# Patient Record
Sex: FEMALE | Race: WHITE | URBAN - METROPOLITAN AREA
[De-identification: names, ages, dates, MRNs, and addresses within clinical notes are randomized per-mention and may not be internally consistent; named-entity substitution may affect disease eponyms.]

---

## 2017-07-10 ENCOUNTER — INPATIENT (INPATIENT)
Facility: HOSPITAL | Age: 32
LOS: 3 days | Discharge: HOME | DRG: 378 | End: 2017-07-14
Attending: HOSPITALIST | Admitting: HOSPITALIST
Payer: COMMERCIAL

## 2017-07-10 VITALS
TEMPERATURE: 98 F | DIASTOLIC BLOOD PRESSURE: 78 MMHG | HEIGHT: 59 IN | OXYGEN SATURATION: 98 % | SYSTOLIC BLOOD PRESSURE: 122 MMHG | RESPIRATION RATE: 18 BRPM | WEIGHT: 130.07 LBS | HEART RATE: 90 BPM

## 2017-07-10 DIAGNOSIS — K52.9 NONINFECTIVE GASTROENTERITIS AND COLITIS, UNSPECIFIED: ICD-10-CM

## 2017-07-10 DIAGNOSIS — K51.00 ULCERATIVE (CHRONIC) PANCOLITIS WITHOUT COMPLICATIONS: ICD-10-CM

## 2017-07-10 DIAGNOSIS — Z98.89 OTHER SPECIFIED POSTPROCEDURAL STATES: Chronic | ICD-10-CM

## 2017-07-10 DIAGNOSIS — K92.0 HEMATEMESIS: ICD-10-CM

## 2017-07-10 LAB
ALBUMIN SERPL ELPH-MCNC: 4.4 G/DL — SIGNIFICANT CHANGE UP (ref 3.3–5.2)
ALP SERPL-CCNC: 76 U/L — SIGNIFICANT CHANGE UP (ref 40–120)
ALT FLD-CCNC: 12 U/L — SIGNIFICANT CHANGE UP
ANION GAP SERPL CALC-SCNC: 15 MMOL/L — SIGNIFICANT CHANGE UP (ref 5–17)
APPEARANCE UR: CLEAR — SIGNIFICANT CHANGE UP
APTT BLD: 34.1 SEC — SIGNIFICANT CHANGE UP (ref 27.5–37.4)
AST SERPL-CCNC: 21 U/L — SIGNIFICANT CHANGE UP
BASOPHILS # BLD AUTO: 0 K/UL — SIGNIFICANT CHANGE UP (ref 0–0.2)
BILIRUB SERPL-MCNC: 0.3 MG/DL — LOW (ref 0.4–2)
BILIRUB UR-MCNC: NEGATIVE — SIGNIFICANT CHANGE UP
BLD GP AB SCN SERPL QL: SIGNIFICANT CHANGE UP
BUN SERPL-MCNC: 19 MG/DL — SIGNIFICANT CHANGE UP (ref 8–20)
CALCIUM SERPL-MCNC: 9.4 MG/DL — SIGNIFICANT CHANGE UP (ref 8.6–10.2)
CHLORIDE SERPL-SCNC: 100 MMOL/L — SIGNIFICANT CHANGE UP (ref 98–107)
CO2 SERPL-SCNC: 24 MMOL/L — SIGNIFICANT CHANGE UP (ref 22–29)
COLOR SPEC: YELLOW — SIGNIFICANT CHANGE UP
CREAT SERPL-MCNC: 1.37 MG/DL — HIGH (ref 0.5–1.3)
DIFF PNL FLD: NEGATIVE — SIGNIFICANT CHANGE UP
EOSINOPHIL # BLD AUTO: 0.2 K/UL — SIGNIFICANT CHANGE UP (ref 0–0.5)
EOSINOPHIL NFR BLD AUTO: 2 % — SIGNIFICANT CHANGE UP (ref 0–5)
EPI CELLS # UR: SIGNIFICANT CHANGE UP
GLUCOSE SERPL-MCNC: 104 MG/DL — SIGNIFICANT CHANGE UP (ref 70–115)
GLUCOSE UR QL: NEGATIVE MG/DL — SIGNIFICANT CHANGE UP
HCT VFR BLD CALC: 40.7 % — SIGNIFICANT CHANGE UP (ref 37–47)
HGB BLD-MCNC: 13.8 G/DL — SIGNIFICANT CHANGE UP (ref 12–16)
INR BLD: 1.04 RATIO — SIGNIFICANT CHANGE UP (ref 0.88–1.16)
KETONES UR-MCNC: NEGATIVE — SIGNIFICANT CHANGE UP
LACTATE BLDV-MCNC: 0.7 MMOL/L — SIGNIFICANT CHANGE UP (ref 0.5–2)
LEUKOCYTE ESTERASE UR-ACNC: NEGATIVE — SIGNIFICANT CHANGE UP
LIDOCAIN IGE QN: 24 U/L — SIGNIFICANT CHANGE UP (ref 22–51)
LYMPHOCYTES # BLD AUTO: 12 % — LOW (ref 20–55)
LYMPHOCYTES # BLD AUTO: 2 K/UL — SIGNIFICANT CHANGE UP (ref 1–4.8)
MCHC RBC-ENTMCNC: 33.7 PG — HIGH (ref 27–31)
MCHC RBC-ENTMCNC: 33.9 G/DL — SIGNIFICANT CHANGE UP (ref 32–36)
MCV RBC AUTO: 99.3 FL — HIGH (ref 81–99)
MONOCYTES # BLD AUTO: 1.5 K/UL — HIGH (ref 0–0.8)
MONOCYTES NFR BLD AUTO: 9 % — SIGNIFICANT CHANGE UP (ref 3–10)
NEUTROPHILS # BLD AUTO: 12.8 K/UL — HIGH (ref 1.8–8)
NEUTROPHILS NFR BLD AUTO: 75 % — HIGH (ref 37–73)
NITRITE UR-MCNC: NEGATIVE — SIGNIFICANT CHANGE UP
PH UR: 7 — SIGNIFICANT CHANGE UP (ref 5–8)
PLAT MORPH BLD: NORMAL — SIGNIFICANT CHANGE UP
PLATELET # BLD AUTO: 341 K/UL — SIGNIFICANT CHANGE UP (ref 150–400)
POTASSIUM SERPL-MCNC: 4.2 MMOL/L — SIGNIFICANT CHANGE UP (ref 3.5–5.3)
POTASSIUM SERPL-SCNC: 4.2 MMOL/L — SIGNIFICANT CHANGE UP (ref 3.5–5.3)
PROT SERPL-MCNC: 7.5 G/DL — SIGNIFICANT CHANGE UP (ref 6.6–8.7)
PROT UR-MCNC: 30 MG/DL
PROTHROM AB SERPL-ACNC: 11.5 SEC — SIGNIFICANT CHANGE UP (ref 9.8–12.7)
RBC # BLD: 4.1 M/UL — LOW (ref 4.4–5.2)
RBC # FLD: 13.6 % — SIGNIFICANT CHANGE UP (ref 11–15.6)
RBC BLD AUTO: NORMAL — SIGNIFICANT CHANGE UP
SODIUM SERPL-SCNC: 139 MMOL/L — SIGNIFICANT CHANGE UP (ref 135–145)
SP GR SPEC: 1 — LOW (ref 1.01–1.02)
TYPE + AB SCN PNL BLD: SIGNIFICANT CHANGE UP
UROBILINOGEN FLD QL: NEGATIVE MG/DL — SIGNIFICANT CHANGE UP
VARIANT LYMPHS # BLD: 2 % — SIGNIFICANT CHANGE UP (ref 0–6)
WBC # BLD: 16.6 K/UL — HIGH (ref 4.8–10.8)
WBC # FLD AUTO: 16.6 K/UL — HIGH (ref 4.8–10.8)
WBC UR QL: SIGNIFICANT CHANGE UP

## 2017-07-10 PROCEDURE — 71020: CPT | Mod: 26

## 2017-07-10 PROCEDURE — 74177 CT ABD & PELVIS W/CONTRAST: CPT | Mod: 26

## 2017-07-10 PROCEDURE — 99253 IP/OBS CNSLTJ NEW/EST LOW 45: CPT

## 2017-07-10 PROCEDURE — 99285 EMERGENCY DEPT VISIT HI MDM: CPT | Mod: 25

## 2017-07-10 PROCEDURE — 76705 ECHO EXAM OF ABDOMEN: CPT | Mod: 26

## 2017-07-10 PROCEDURE — 99223 1ST HOSP IP/OBS HIGH 75: CPT

## 2017-07-10 RX ORDER — ONDANSETRON 8 MG/1
4 TABLET, FILM COATED ORAL EVERY 8 HOURS
Qty: 0 | Refills: 0 | Status: DISCONTINUED | OUTPATIENT
Start: 2017-07-10 | End: 2017-07-10

## 2017-07-10 RX ORDER — ONDANSETRON 8 MG/1
4 TABLET, FILM COATED ORAL ONCE
Qty: 0 | Refills: 0 | Status: COMPLETED | OUTPATIENT
Start: 2017-07-10 | End: 2017-07-10

## 2017-07-10 RX ORDER — SODIUM CHLORIDE 9 MG/ML
1000 INJECTION INTRAMUSCULAR; INTRAVENOUS; SUBCUTANEOUS ONCE
Qty: 0 | Refills: 0 | Status: COMPLETED | OUTPATIENT
Start: 2017-07-10 | End: 2017-07-10

## 2017-07-10 RX ORDER — ONDANSETRON 8 MG/1
4 TABLET, FILM COATED ORAL EVERY 6 HOURS
Qty: 0 | Refills: 0 | Status: DISCONTINUED | OUTPATIENT
Start: 2017-07-10 | End: 2017-07-13

## 2017-07-10 RX ORDER — FAMOTIDINE 10 MG/ML
20 INJECTION INTRAVENOUS ONCE
Qty: 0 | Refills: 0 | Status: COMPLETED | OUTPATIENT
Start: 2017-07-10 | End: 2017-07-10

## 2017-07-10 RX ORDER — CIPROFLOXACIN LACTATE 400MG/40ML
400 VIAL (ML) INTRAVENOUS ONCE
Qty: 0 | Refills: 0 | Status: COMPLETED | OUTPATIENT
Start: 2017-07-10 | End: 2017-07-10

## 2017-07-10 RX ORDER — CIPROFLOXACIN LACTATE 400MG/40ML
400 VIAL (ML) INTRAVENOUS EVERY 12 HOURS
Qty: 0 | Refills: 0 | Status: DISCONTINUED | OUTPATIENT
Start: 2017-07-10 | End: 2017-07-12

## 2017-07-10 RX ORDER — SODIUM CHLORIDE 9 MG/ML
3 INJECTION INTRAMUSCULAR; INTRAVENOUS; SUBCUTANEOUS ONCE
Qty: 0 | Refills: 0 | Status: COMPLETED | OUTPATIENT
Start: 2017-07-10 | End: 2017-07-10

## 2017-07-10 RX ORDER — PANTOPRAZOLE SODIUM 20 MG/1
40 TABLET, DELAYED RELEASE ORAL ONCE
Qty: 0 | Refills: 0 | Status: COMPLETED | OUTPATIENT
Start: 2017-07-10 | End: 2017-07-10

## 2017-07-10 RX ORDER — SODIUM CHLORIDE 9 MG/ML
1000 INJECTION INTRAMUSCULAR; INTRAVENOUS; SUBCUTANEOUS
Qty: 0 | Refills: 0 | Status: DISCONTINUED | OUTPATIENT
Start: 2017-07-10 | End: 2017-07-11

## 2017-07-10 RX ORDER — METRONIDAZOLE 500 MG
500 TABLET ORAL EVERY 8 HOURS
Qty: 0 | Refills: 0 | Status: DISCONTINUED | OUTPATIENT
Start: 2017-07-10 | End: 2017-07-12

## 2017-07-10 RX ORDER — SUCRALFATE 1 G
1 TABLET ORAL ONCE
Qty: 0 | Refills: 0 | Status: COMPLETED | OUTPATIENT
Start: 2017-07-10 | End: 2017-07-10

## 2017-07-10 RX ORDER — PANTOPRAZOLE SODIUM 20 MG/1
8 TABLET, DELAYED RELEASE ORAL
Qty: 80 | Refills: 0 | Status: DISCONTINUED | OUTPATIENT
Start: 2017-07-10 | End: 2017-07-11

## 2017-07-10 RX ORDER — METRONIDAZOLE 500 MG
500 TABLET ORAL ONCE
Qty: 0 | Refills: 0 | Status: COMPLETED | OUTPATIENT
Start: 2017-07-10 | End: 2017-07-10

## 2017-07-10 RX ORDER — IPRATROPIUM/ALBUTEROL SULFATE 18-103MCG
3 AEROSOL WITH ADAPTER (GRAM) INHALATION EVERY 6 HOURS
Qty: 0 | Refills: 0 | Status: DISCONTINUED | OUTPATIENT
Start: 2017-07-10 | End: 2017-07-14

## 2017-07-10 RX ORDER — MORPHINE SULFATE 50 MG/1
4 CAPSULE, EXTENDED RELEASE ORAL ONCE
Qty: 0 | Refills: 0 | Status: DISCONTINUED | OUTPATIENT
Start: 2017-07-10 | End: 2017-07-10

## 2017-07-10 RX ADMIN — Medication 200 MILLIGRAM(S): at 15:05

## 2017-07-10 RX ADMIN — SODIUM CHLORIDE 1000 MILLILITER(S): 9 INJECTION INTRAMUSCULAR; INTRAVENOUS; SUBCUTANEOUS at 21:33

## 2017-07-10 RX ADMIN — SODIUM CHLORIDE 3 MILLILITER(S): 9 INJECTION INTRAMUSCULAR; INTRAVENOUS; SUBCUTANEOUS at 15:57

## 2017-07-10 RX ADMIN — PANTOPRAZOLE SODIUM 40 MILLIGRAM(S): 20 TABLET, DELAYED RELEASE ORAL at 08:03

## 2017-07-10 RX ADMIN — ONDANSETRON 4 MILLIGRAM(S): 8 TABLET, FILM COATED ORAL at 08:06

## 2017-07-10 RX ADMIN — ONDANSETRON 4 MILLIGRAM(S): 8 TABLET, FILM COATED ORAL at 21:35

## 2017-07-10 RX ADMIN — ONDANSETRON 4 MILLIGRAM(S): 8 TABLET, FILM COATED ORAL at 23:03

## 2017-07-10 RX ADMIN — Medication 1 GRAM(S): at 08:07

## 2017-07-10 RX ADMIN — SODIUM CHLORIDE 1000 MILLILITER(S): 9 INJECTION INTRAMUSCULAR; INTRAVENOUS; SUBCUTANEOUS at 08:03

## 2017-07-10 RX ADMIN — Medication 100 MILLIGRAM(S): at 17:14

## 2017-07-10 RX ADMIN — Medication 100 MILLIGRAM(S): at 23:03

## 2017-07-10 RX ADMIN — PANTOPRAZOLE SODIUM 10 MG/HR: 20 TABLET, DELAYED RELEASE ORAL at 18:31

## 2017-07-10 RX ADMIN — FAMOTIDINE 20 MILLIGRAM(S): 10 INJECTION INTRAVENOUS at 08:03

## 2017-07-10 RX ADMIN — MORPHINE SULFATE 4 MILLIGRAM(S): 50 CAPSULE, EXTENDED RELEASE ORAL at 15:05

## 2017-07-10 RX ADMIN — ONDANSETRON 4 MILLIGRAM(S): 8 TABLET, FILM COATED ORAL at 15:05

## 2017-07-10 NOTE — H&P ADULT - ASSESSMENT
33 y/o female with PMHx of Asthma, ruptured ovarian cyst s/p ex laparotomy, colitis in 2016, chronic diarrhea for 2-3 years (never being worked up), presented with abdomen pain, nausea, bloody vomiting, chronic diarrhea. Exam unremarkable, labs significant for leukocytosis, ROSALINDA and CT abdomen shows colitis:    Colitis:  - Keep NPO, IVFs  - Start Cipro and flagyl.  - Pt had colitis one year ago and was seen by GI, followed up outpatient with Dr Atkins and colonoscopy was recommended given chronic diarrhea but has been done.    GI bleed likely PUD:  - Chronic smoker with heartburns, nausea, bloody vomiting this morning, patient hemodynamically stable for now.   -Keep NPO, IVFs, IVPB PPI, Zofran prn for nausea/vomtiting.  - Keep on monitored bed, monitor CBC.   - GI consulted, awaiting evaluation. If any active bleeding while inpatient please upgrade to step-down and call on-call GI stat.    H/o Asthma:  - Stable, not on any medication at home. Prn Duonebs.    DVT ppx:  - SCD, no anticoagulation given bloody vomiting. 33 y/o female with PMHx of Asthma, ruptured ovarian cyst s/p ex laparotomy, colitis in 2016, chronic diarrhea for 2-3 years (never being worked up), presented with abdomen pain, nausea, bloody vomiting, chronic diarrhea. Exam unremarkable, labs significant for leukocytosis, ROSALINDA and CT abdomen shows colitis:    Colitis:  - Keep NPO, IVFs  - Start Cipro and flagyl.  - Pt had colitis one year ago and was seen by GI, followed up outpatient with Dr Atkins and colonoscopy was recommended given chronic diarrhea but has been done.    GI bleed likely PUD:  - Chronic smoker with heartburns, nausea, bloody vomiting this morning, patient hemodynamically stable for now.   -Keep NPO, IVFs, IVPB PPI, Zofran prn for nausea/vomtiting.  - Keep on monitored bed, monitor CBC.   - GI consulted, awaiting evaluation. If any active bleeding while inpatient please upgrade to step-down and call on-call GI stat.    ROSALINDA in setting of dehydration/vomiting:  - Start on IVfs  cc/hour and monitor BMP.    H/o Asthma:  - Stable, not on any medication at home. Prn Duonebs.    DVT ppx:  - SCD, no anticoagulation given bloody vomiting. 31 y/o female with PMHx of Asthma, ruptured ovarian cyst s/p ex laparotomy, colitis in 2016, chronic diarrhea for 2-3 years (never being worked up), heartburns lately, presented to ED with abdomen pain for 2 weeks associated with nausea, hematemesis X 4 episodes, chronic diarrhea. Exam unremarkable, labs significant for leukocytosis, ROSALINDA and CT abdomen shows colitis.    Colitis:  - Start IVFs, send stool studies.  - S/p Cipro and flagyl dose in ED, will c/w same antibiotics for now to cover infectious etiology.   - Pt had colitis one year ago and was seen by GI, followed up outpatient with Dr Atkins and colonoscopy was recommended given chronic diarrhea but has been done.  - GI consulted today.    Hematemesis/GI bleed likely PUD:  - Chronic smoker with heartburns, nausea, bloody vomiting this morning, patient hemodynamically stable for now.   - Keep NPO, IVFs, IVPB PPI, Zofran prn for nausea/vomiting.  - Keep on monitored bed, monitor CBC.   - GI consulted, awaiting evaluation. If any active bleeding while inpatient please upgrade to step-down and call on-call GI stat.    ROSALINDA in setting of dehydration/vomiting:  - Start on IVFs  cc/hour and monitor BMP.    H/o Asthma:  - Stable, not on any medication at home. Prn Duonebs.    DVT ppx:  - SCD, no anticoagulation given bloody vomiting.

## 2017-07-10 NOTE — ED ADULT NURSE NOTE - ED STAT RN HANDOFF DETAILS
chart reviewed for stat orders report given to hr rn, pt a/ox3 kendal,ruddyn good resp even and un;lbored iv patent

## 2017-07-10 NOTE — H&P ADULT - HISTORY OF PRESENT ILLNESS
Pt is 33 y/o female with PMHx of Asthma, ruptured ovarian cyst s/p ex laparotomy, colitis in 2016, chronic diarrhea for 2-3 years (never being worked up), presented to ER with active complaints of abdomen pain for 2 weeks. Patient stated pain started as heartburns 2 weeks, intermittent, gradually progressing and now having diffuse abdomen pain, describes as gas like feeling, no relieving or aggravating factors, associated with nausea and vomiting X 4 episode earlier this morning. Pt further stated it was blood vomiting with bright red blood, last episode was earlier this morning before coming to ER. She also reported chronic diarrhea 4-5 episode per day for last 2-3 years, no reported blood in stool.   Off note, patient had last admission at [facility] 06/16 with colitis and ROSALINDA. At that time she was evaluated by GI and afterward seen by Dr Atkins and colonoscopy was recommended but has not been done yet. Pt is 31 y/o female with PMHx of Asthma, ruptured ovarian cyst s/p ex laparotomy, colitis in 2016, chronic diarrhea for 2-3 years (never being worked up), heartburns lately, presented to ER with active complaints of abdomen pain for 2 weeks. Patient stated pain started as heartburns 2 weeks, intermittent, gradually progressing and now having diffuse abdomen pain, describes as gas like feeling, no relieving or aggravating factors, tried pepcid/tumps at home without significant improvement, associated with reduced appetite, nausea and bloody vomiting X 4 episode earlier this morning. Pt further stated it was bloody vomiting with bright red blood, last episode was earlier this morning before coming to ER. She further stated having heartburns lately after having [employer] coffee. She also reported chronic diarrhea 4-5 episode per day for last 2-3 years, no reported blood in stool. She has not been worked up for chronic diarrhea.   Off note, patient had last admission at [facility] 06/16 with colitis and ROSALINDA. At that time she was evaluated by GI and afterward seen by Dr Atkins as an outpatient and colonoscopy was recommended but has not been done yet.

## 2017-07-10 NOTE — H&P ADULT - NSHPSOCIALHISTORY_GEN_ALL_CORE
Active smoker, denied use of illicit drugs, non-alcoholic. Pt is single, does not have kids, work as psychiatric attendant, active smoker 1 PPD for 10 years, denied use of illicit drugs, non-alcoholic.

## 2017-07-10 NOTE — H&P ADULT - NSHPPHYSICALEXAM_GEN_ALL_CORE
PHYSICAL EXAM:    Vital Signs Last 24 Hrs  T(C): 36.8 (10 Jul 2017 07:30), Max: 36.8 (10 Jul 2017 07:30)  T(F): 98.2 (10 Jul 2017 07:30), Max: 98.2 (10 Jul 2017 07:30)  HR: 90 (10 Jul 2017 07:30) (90 - 90)  BP: 122/78 (10 Jul 2017 07:30) (122/78 - 122/78)  BP(mean): --  RR: 18 (10 Jul 2017 07:30) (18 - 18)  SpO2: 98% (10 Jul 2017 07:30) (98% - 98%)    GENERAL: Young female lying comfortably on chair, NAD  HEENT: PERRL, +EOMI  NECK: soft, Supple, No JVD,   CHEST/LUNG: Clear to auscultate bilaterally; No wheezing  HEART: S1S2+, Regular rate and rhythm; No murmurs.  ABDOMEN: Soft, Nontender, Nondistended; Bowel sounds present  EXTREMITIES:  2+ Peripheral Pulses, No edema  SKIN: No rashes or lesions  NEURO: AAOX3, no focal deficits, no motor r sensory loss  PSYCH: normal mood PHYSICAL EXAM:    Vital Signs Last 24 Hrs  T(C): 36.8 (10 Jul 2017 07:30), Max: 36.8 (10 Jul 2017 07:30)  T(F): 98.2 (10 Jul 2017 07:30), Max: 98.2 (10 Jul 2017 07:30)  HR: 90 (10 Jul 2017 07:30) (90 - 90)  BP: 122/78 (10 Jul 2017 07:30) (122/78 - 122/78)  BP(mean): --  RR: 18 (10 Jul 2017 07:30) (18 - 18)  SpO2: 98% (10 Jul 2017 07:30) (98% - 98%)    GENERAL: Young female lying comfortably on chair, NAD  HEENT: PERRL, +EOMI  NECK: soft, Supple, No JVD,   CHEST/LUNG: Clear to auscultate bilaterally; No wheezing  HEART: S1S2+, Regular rate and rhythm; No murmurs, no tachycardia.  ABDOMEN: Soft, Nontender, Nondistended; Bowel sounds present  EXTREMITIES:  2+ Peripheral Pulses, No edema  SKIN: No rashes or lesions  NEURO: AAOX3, no focal deficits, no motor r sensory loss  PSYCH: normal mood PHYSICAL EXAM:    Vital Signs Last 24 Hrs  T(C): 36.8 (10 Jul 2017 07:30), Max: 36.8 (10 Jul 2017 07:30)  T(F): 98.2 (10 Jul 2017 07:30), Max: 98.2 (10 Jul 2017 07:30)  HR: 90 (10 Jul 2017 07:30) (90 - 90)  BP: 122/78 (10 Jul 2017 07:30) (122/78 - 122/78)  BP(mean): --  RR: 18 (10 Jul 2017 07:30) (18 - 18)  SpO2: 98% (10 Jul 2017 07:30) (98% - 98%)    GENERAL: Young female lying comfortably on chair, NAD  HEENT: PERRL, +EOMI  NECK: soft, Supple, No JVD,   CHEST/LUNG: Clear to auscultate bilaterally; No wheezing  HEART: S1S2+, Regular rate and rhythm; No murmurs, no tachycardia.  ABDOMEN: Soft, Nontender, Nondistended; Bowel sounds present.  EXTREMITIES:  2+ Peripheral Pulses, No edema  SKIN: No rashes or lesions  NEURO: AAOX3, no focal deficits, no motor r sensory loss  PSYCH: normal mood

## 2017-07-10 NOTE — ED STATDOCS - OBJECTIVE STATEMENT
31 y/o F pt with PMHx of colitis presents to ED c/o epigastric abdominal pain and heartburn x2 weeks. Pt reports her pain now radiates into her back and she had 4 episodes of vomiting; the last 3 "was all blood". She describes the color as bright red. Pt rates the severity of her pain as 7/10. She does not currently take any medications. She saw a GI doctor (Dr. Chapman) a few months ago and is supposed to f/u for an endoscopy. She also experiences decreased PO intake due to heartburn. Pt admits to current every day tobacco usage. As per mother, she was hospitalized for 4 days last year for colitis and acute renal failure. She has self medicated with Tums and Pepcid. Pt denies fever, chills, CP, SOB, diarrhea, melena, dysuria, hematuria, headache, numbness, and tingling. No further complaints at this time. Allergy to penicillin. 31 y/o F pt with PMHx of colitis presents to ED c/o epigastric abdominal pain and heartburn x2 weeks. Pt reports her pain now radiates into her back and she had 4 episodes of vomiting this morning; the last 3 "was all blood". She describes the color as bright red (pt provided picture which showed some pink streaks in vomitus). Pt rates the severity of her pain as 7/10. She does not currently take any medications. She saw a GI doctor (Dr. Chapman) a few months ago and is supposed to f/u for a colonoscopy. She also experiences decreased PO intake due to heartburn but says she is able to eat and drink. Pt admits to current every day tobacco usage. As per mother, she was hospitalized for 4 days last year for colitis and acute renal failure. She has self medicated with Tums and Pepcid. Pt denies fever, chills, CP, SOB, diarrhea, melena, dysuria, hematuria, headache, numbness, and tingling. No further complaints at this time. Allergy to penicillin.

## 2017-07-10 NOTE — H&P ADULT - NSHPREVIEWOFSYSTEMS_GEN_ALL_CORE
REVIEW OF SYSTEMS  General:	+ve some fatigue/weakness, reduced appetite, nausea and vomiting.  Ophthalmologic: No eye pain, itching or blurry vision.  Respiratory and Thorax: No chest pain, SOB, cough, sputum.  Cardiovascular: No chest pain, palpitation, SOB.	  Gastrointestinal: Positive for diarrhea, abdomen pain, nausea, vomiting.  Genitourinary: Negative  Musculoskeletal: Negative	  Neurological: Intact, no headache, dizziness or focal signs.	  Psychiatric: Normal Mood.	  Endocrine: Negative General: +ve some fatigue/weakness, reduced appetite, nausea and vomiting.  Ophthalmologic: No eye pain, itching or blurry vision.  Respiratory and Thorax: No chest pain, SOB, cough, sputum.  Cardiovascular: No chest pain, palpitation, SOB.	  Gastrointestinal: Positive for diarrhea, abdomen pain, nausea, vomiting.  Genitourinary: Negative  Musculoskeletal: Negative	  Neurological: Intact, no headache, dizziness or focal signs.	  Psychiatric: Normal Mood.	  Endocrine: Negative

## 2017-07-10 NOTE — H&P ADULT - NSHPLABSRESULTS_GEN_ALL_CORE
LABS:                        13.8   16.6  )-----------( 341      ( 10 Jul 2017 08:11 )             40.7     07-10    139  |  100  |  19.0  ----------------------------<  104  4.2   |  24.0  |  1.37<H>    Ca    9.4      10 Jul 2017 08:11    TPro  7.5  /  Alb  4.4  /  TBili  0.3<L>  /  DBili  x   /  AST  21  /  ALT  12  /  AlkPhos  76  07-10    PT/INR - ( 10 Jul 2017 08:11 )   PT: 11.5 sec;   INR: 1.04 ratio         PTT - ( 10 Jul 2017 08:11 )  PTT:34.1 sec  RECENT CULTURES:      LIVER FUNCTIONS - ( 10 Jul 2017 08:11 )  Alb: 4.4 g/dL / Pro: 7.5 g/dL / ALK PHOS: 76 U/L / ALT: 12 U/L / AST: 21 U/L / GGT: x           Urinalysis Basic - ( 10 Jul 2017 09:48 )    Color: Yellow / Appearance: Clear / S.005 / pH: x  Gluc: x / Ketone: Negative  / Bili: Negative / Urobili: Negative mg/dL   Blood: x / Protein: 30 mg/dL / Nitrite: Negative   Leuk Esterase: Negative / RBC: x / WBC 0-2   Sq Epi: x / Non Sq Epi: x / Bacteria: x

## 2017-07-10 NOTE — ED STATDOCS - MEDICAL DECISION MAKING DETAILS
CXR, US, medication for dyspepsia, and reevaluate. Consider admission pending results. CXR, US, medication for dyspepsia, and reevaluate. possible ulcer vs. soto lange. Consider admission pending results/symptoms.

## 2017-07-10 NOTE — H&P ADULT - FAMILY HISTORY
<<-----Click on this checkbox to enter Family History Family history of colitis     Sibling  Still living? Yes, Estimated age: Age Unknown  Family history of irritable bowel syndrome, Age at diagnosis: Age Unknown

## 2017-07-10 NOTE — ED STATDOCS - GASTROINTESTINAL, MLM
RLQ tenderness and mild epigastric tenderness. No abdominal distension. Pt showed picture of vomit, which had pinkish tint. mild RLQ tenderness and mild epigastric tenderness. No abdominal distension. Pt showed picture of vomit, which had pinkish tint.

## 2017-07-10 NOTE — H&P ADULT - PMH
Acute renal failure  Active  Asthma  Stable, not in flare  Colitis    Herpes    Ovarian cyst  Stable. Acute renal failure    Asthma    Colitis    Herpes    Ovarian cyst

## 2017-07-10 NOTE — ED ADULT TRIAGE NOTE - CHIEF COMPLAINT QUOTE
"For the past two weeks I have been having upper abdominal pain, I woke up this morning and threw up blood." Pt denies fever but states has chills. Pt states she has colitis so she normally has diarrhea.

## 2017-07-10 NOTE — ED STATDOCS - PROGRESS NOTE DETAILS
NP NOTE: Pt seen by intake physician and HPI/ROS/PE/MDM reviewed. Pt seen and evaluated. Discussed plan and any resulted studies at this time. Will continue to monitor and re-evaluate.  Re-Evaluation: labs noted, US noted, pt now resting comfortably, previously tender RLQ, WBC = 16, CT r/o appy vs recurrent colitis, will continue to monitor and re-eval discussed results with patient, agrees with admission. pain and nausea persist, will add morphine, zofran, as well as cultures and lactate, cipro and flagyl.

## 2017-07-10 NOTE — H&P ADULT - NSHPOUTPATIENTPROVIDERS_GEN_ALL_CORE
Unknown. Dr Estes from [facility]. Dr Estes from [facility].  Phone:[phone].

## 2017-07-10 NOTE — CONSULT NOTE ADULT - SUBJECTIVE AND OBJECTIVE BOX
Patient is a 32y old  Female who presents with a chief complaint of     HPI:  Pt is 31 y/o female with PMHx of Asthma, ruptured ovarian cyst s/p ex laparotomy. Patient  states for the last one week, she has been getting frequent heartburn which began after drinking an expreso coffee. . She is getting epigastric cramping  pain  lasting 2 hours.( severe at times worse with eating. Decreased appetite. Today had 4 episodes of hematemesis. She had a photo- blood tinged emesis. Tried pepcid and Tums with no relief.  She has been having 4-5 loose, non blood stools  for 2 days. No fevers. Also with back pain.  No sick contacts, no recent antibiotics. No NSAIDS. IN ER pt noted to have wbc 16, CT showed colitis in ascending, transverse adn descending ( mild ) , worst in the sigmoid.      Pt was admitted in 2016 with a left sided colitis. Saw Dr Good a few months ago in follow from 2016 hospitalization with plan for colonoscopy. Pt states she gets intermittent diarrhea a few times a week on a chronic basis.          REVIEW OF SYSTEMS:  Constitutional: No fever, weight loss or fatigue  ENMT:  No difficulty hearing, tinnitus, vertigo; No sinus or throat pain  Respiratory: No cough, wheezing, chills or hemoptysis  Cardiovascular: No chest pain, palpitations, dizziness or leg swelling  Gastrointestinal: + epigastric pain, diarrhea  Musculoskeletal: No joint pain or swelling; No muscle, back or extremity pain    PAST MEDICAL & SURGICAL HISTORY:  Acute renal failure  Colitis  Herpes  Asthma  Ovarian cyst  H/O exploratory laparotomy: s/p ovarian cyst rupture affecting artery      FAMILY HISTORY:  Family history of colitis  Family history of irritable bowel syndrome (Sibling)      SOCIAL HISTORY:  Smoking Status: [ ] Current, [ ] Former, [ ] Never  Pack Years:  [  ] EtOH- no  [  ] IVDA- no    MEDICATIONS:  MEDICATIONS  (STANDING):  metroNIDAZOLE  IVPB 500 milliGRAM(s) IV Intermittent once  sodium chloride 0.9% Bolus 1000 milliLiter(s) IV Bolus once  sodium chloride 0.9% Bolus 1000 milliLiter(s) IV Bolus once  sodium chloride 0.9%. 1000 milliLiter(s) (125 mL/Hr) IV Continuous <Continuous>  ciprofloxacin   IVPB 400 milliGRAM(s) IV Intermittent every 12 hours  metroNIDAZOLE  IVPB 500 milliGRAM(s) IV Intermittent every 8 hours  pantoprazole Infusion 8 mG/Hr (10 mL/Hr) IV Continuous <Continuous>    MEDICATIONS  (PRN):  ondansetron Injectable 4 milliGRAM(s) IV Push every 8 hours PRN Nausea and/or Vomiting      Allergies    penicillin (Unknown)    Intolerances        Vital Signs Last 24 Hrs  T(C): 36.6 (10 Jul 2017 15:23), Max: 36.8 (10 Jul 2017 07:30)  T(F): 97.9 (10 Jul 2017 15:23), Max: 98.2 (10 Jul 2017 07:30)  HR: 82 (10 Jul 2017 15:23) (82 - 90)  BP: 102/65 (10 Jul 2017 15:23) (102/65 - 122/78)  BP(mean): --  RR: 18 (10 Jul 2017 15:23) (18 - 18)  SpO2: 100% (10 Jul 2017 15:23) (98% - 100%)        PHYSICAL EXAM:    General: Well developed; well nourished; in no acute distress  HEENT: MMM, conjunctiva and sclera clear  H- RRR  L- CTA  Gastrointestinal: Soft, non-tender non-distended; Normal bowel sounds; No rebound or guarding  Extremities: Normal range of motion, No clubbing, cyanosis or edema  Neurological: Alert and oriented x3  Skin: Warm and dry. No obvious rash  rectal - empty rectal vault. No blood, no melena      LABS:                        13.8   16.6  )-----------( 341      ( 10 Jul 2017 08:11 )             40.7     10 Jul 2017 08:11    139    |  100    |  19.0   ----------------------------<  104    4.2     |  24.0   |  1.37     Ca    9.4        10 Jul 2017 08:11    TPro  7.5    /  Alb  4.4    /  TBili  0.3    /  DBili  x      /  AST  21     /  ALT  12     /  AlkPhos  76     / Amylase x      /Lipase 24     10 Jul 2017 08:11              RADIOLOGY & ADDITIONAL STUDIES: < from: CT Abdomen and Pelvis w/ Oral Cont and w/ IV Cont (07.10.17 @ 12:09) >  IMPRESSION:        Pancolitis, most severe involving the sigmoid colon.

## 2017-07-10 NOTE — CONSULT NOTE ADULT - PROBLEM SELECTOR RECOMMENDATION 9
Maybe infectious as she has diarrhea and nausea and vomiting.    Hematemesis. Maybe esophagitis from GERD. H+H stable  IV fluid ( creatinine is 1.37), zofran, protonix clear liquids  EGD if H+H drops significantly  Stool studies to r/o infectious colitis

## 2017-07-11 LAB
ANION GAP SERPL CALC-SCNC: 16 MMOL/L — SIGNIFICANT CHANGE UP (ref 5–17)
BUN SERPL-MCNC: 13 MG/DL — SIGNIFICANT CHANGE UP (ref 8–20)
C DIFF BY PCR RESULT: SIGNIFICANT CHANGE UP
C DIFF TOX GENS STL QL NAA+PROBE: SIGNIFICANT CHANGE UP
CALCIUM SERPL-MCNC: 8.5 MG/DL — LOW (ref 8.6–10.2)
CHLORIDE SERPL-SCNC: 106 MMOL/L — SIGNIFICANT CHANGE UP (ref 98–107)
CO2 SERPL-SCNC: 21 MMOL/L — LOW (ref 22–29)
CREAT SERPL-MCNC: 1.94 MG/DL — HIGH (ref 0.5–1.3)
GLUCOSE SERPL-MCNC: 89 MG/DL — SIGNIFICANT CHANGE UP (ref 70–115)
HCT VFR BLD CALC: 37 % — SIGNIFICANT CHANGE UP (ref 37–47)
HGB BLD-MCNC: 12.4 G/DL — SIGNIFICANT CHANGE UP (ref 12–16)
MCHC RBC-ENTMCNC: 33.5 G/DL — SIGNIFICANT CHANGE UP (ref 32–36)
MCHC RBC-ENTMCNC: 33.5 PG — HIGH (ref 27–31)
MCV RBC AUTO: 100 FL — HIGH (ref 81–99)
PLATELET # BLD AUTO: 297 K/UL — SIGNIFICANT CHANGE UP (ref 150–400)
POTASSIUM SERPL-MCNC: 3.8 MMOL/L — SIGNIFICANT CHANGE UP (ref 3.5–5.3)
POTASSIUM SERPL-SCNC: 3.8 MMOL/L — SIGNIFICANT CHANGE UP (ref 3.5–5.3)
RBC # BLD: 3.7 M/UL — LOW (ref 4.4–5.2)
RBC # FLD: 13.9 % — SIGNIFICANT CHANGE UP (ref 11–15.6)
SODIUM SERPL-SCNC: 143 MMOL/L — SIGNIFICANT CHANGE UP (ref 135–145)
WBC # BLD: 12.8 K/UL — HIGH (ref 4.8–10.8)
WBC # FLD AUTO: 12.8 K/UL — HIGH (ref 4.8–10.8)

## 2017-07-11 PROCEDURE — 99233 SBSQ HOSP IP/OBS HIGH 50: CPT

## 2017-07-11 RX ORDER — PANTOPRAZOLE SODIUM 20 MG/1
40 TABLET, DELAYED RELEASE ORAL
Qty: 0 | Refills: 0 | Status: DISCONTINUED | OUTPATIENT
Start: 2017-07-11 | End: 2017-07-12

## 2017-07-11 RX ORDER — SODIUM CHLORIDE 9 MG/ML
1000 INJECTION INTRAMUSCULAR; INTRAVENOUS; SUBCUTANEOUS
Qty: 0 | Refills: 0 | Status: DISCONTINUED | OUTPATIENT
Start: 2017-07-11 | End: 2017-07-13

## 2017-07-11 RX ORDER — SODIUM CHLORIDE 9 MG/ML
500 INJECTION INTRAMUSCULAR; INTRAVENOUS; SUBCUTANEOUS ONCE
Qty: 0 | Refills: 0 | Status: DISCONTINUED | OUTPATIENT
Start: 2017-07-11 | End: 2017-07-14

## 2017-07-11 RX ADMIN — ONDANSETRON 4 MILLIGRAM(S): 8 TABLET, FILM COATED ORAL at 17:50

## 2017-07-11 RX ADMIN — Medication 100 MILLIGRAM(S): at 05:31

## 2017-07-11 RX ADMIN — ONDANSETRON 4 MILLIGRAM(S): 8 TABLET, FILM COATED ORAL at 11:29

## 2017-07-11 RX ADMIN — Medication 200 MILLIGRAM(S): at 11:29

## 2017-07-11 RX ADMIN — Medication 200 MILLIGRAM(S): at 02:32

## 2017-07-11 RX ADMIN — Medication 100 MILLIGRAM(S): at 17:49

## 2017-07-11 RX ADMIN — PANTOPRAZOLE SODIUM 10 MG/HR: 20 TABLET, DELAYED RELEASE ORAL at 07:58

## 2017-07-11 RX ADMIN — ONDANSETRON 4 MILLIGRAM(S): 8 TABLET, FILM COATED ORAL at 05:31

## 2017-07-11 RX ADMIN — SODIUM CHLORIDE 125 MILLILITER(S): 9 INJECTION INTRAMUSCULAR; INTRAVENOUS; SUBCUTANEOUS at 08:38

## 2017-07-11 RX ADMIN — PANTOPRAZOLE SODIUM 10 MG/HR: 20 TABLET, DELAYED RELEASE ORAL at 05:30

## 2017-07-11 NOTE — PROGRESS NOTE ADULT - ASSESSMENT
33 y/o female with PMHx of Asthma, ruptured ovarian cyst s/p ex laparotomy, colitis in 2016, chronic diarrhea for 2-3 years (never being worked up), heartburns lately, presented to ED with abdomen pain for 2 weeks associated with nausea, hematemesis X 4 episodes, chronic diarrhea. Exam unremarkable, labs significant for leukocytosis, ROSALINDA and CT abdomen shows colitis. Pt admitted with impression of hematemesis likely from PUD and colitis. She was kept NPO, started on IVFs, Ciprofloxacin and flagyl. GI was consulted and recommendation appreciated. Pt symptoms improved.    Colitis:  - C/w IVFs, Cipro and flagyl.   -F/u stool studies.   - Pt had colitis one year ago and was seen by GI, followed up outpatient with Dr Atkins and colonoscopy was recommended given chronic diarrhea but has been done.   -GI consult appreciated.    Hematemesis/GI bleed likely PUD:  - Chronic smoker with heartburns, nausea, hematemesis, hemodynamically stable for now. Slight drop in all cell line on CBC likely due to hydration.   - Pt ok with liquid diet, advance today,  IVFs, IVPB PPI, Zofran prn for nausea/vomiting.  - If continues to improve, may be discharge tomorrow.  - GI consult appreciated.    ROSALINDA in setting of dehydration/vomiting:  -Worsening renal function, Cr 1.37--->1.9  - Stat  bolus and then increase IVFs to 150 cc/hour.    H/o Asthma:  - Stable, not on any medication at home. Prn Duoneb.    DVT ppx:  - SCD, no anticoagulation given bloody vomiting.

## 2017-07-11 NOTE — PROGRESS NOTE ADULT - SUBJECTIVE AND OBJECTIVE BOX
PAMELA ROBLERO Female 32y [id number]    Pt seen and examined at bedside, no over night event reported by night staff. Pt reports doing well, no more nausea/vomiting reported but does reports diarrhea which she attributes at baseline. She was able to tolerate liquid diet this morning.            Physical Exam:    Vital Signs Last 24 Hrs  T(C): 36.6 (2017 11:37), Max: 36.7 (10 Jul 2017 15:53)  T(F): 97.8 (2017 11:37), Max: 98.1 (10 Jul 2017 15:53)  HR: 62 (2017 11:37) (57 - 82)  BP: 89/60 (2017 11:37) (89/60 - 116/70)  BP(mean): --  RR: 18 (2017 11:37) (18 - 18)  SpO2: 98% (2017 11:37) (96% - 100%)    GENERAL: Young female lying comfortably on chair, NAD  HEENT: PERRL, +EOMI  NECK: soft, Supple, No JVD,   CHEST/LUNG: Clear to auscultate bilaterally; No wheezing  HEART: S1S2+, Regular rate and rhythm; No murmurs, no tachycardia.  ABDOMEN: Soft, Nontender, Nondistended; Bowel sounds present.  EXTREMITIES:  2+ Peripheral Pulses, No edema  SKIN: No rashes or lesions  NEURO: AAOX3, no focal deficits, no motor r sensory loss  PSYCH: normal mood      MEDICATIONS  (STANDING):  ciprofloxacin   IVPB 400 milliGRAM(s) IV Intermittent every 12 hours  metroNIDAZOLE  IVPB 500 milliGRAM(s) IV Intermittent every 8 hours  pantoprazole Infusion 8 mG/Hr (10 mL/Hr) IV Continuous <Continuous>  ondansetron Injectable 4 milliGRAM(s) IV Push every 6 hours  sodium chloride 0.9% Bolus 500 milliLiter(s) IV Bolus once  sodium chloride 0.9%. 1000 milliLiter(s) (150 mL/Hr) IV Continuous <Continuous>    MEDICATIONS  (PRN):  ALBUTerol/ipratropium for Nebulization 3 milliLiter(s) Nebulizer every 6 hours PRN Shortness of Breath        Labs:  LABS:                        12.4   12.8  )-----------( 297      ( 2017 09:09 )             37.0     07-11    143  |  106  |  13.0  ----------------------------<  89  3.8   |  21.0<L>  |  1.94<H>    Ca    8.5<L>      2017 09:09    TPro  7.5  /  Alb  4.4  /  TBili  0.3<L>  /  DBili  x   /  AST  21  /  ALT  12  /  AlkPhos  76  07-10    PT/INR - ( 10 Jul 2017 08:11 )   PT: 11.5 sec;   INR: 1.04 ratio         PTT - ( 10 Jul 2017 08:11 )  PTT:34.1 sec  RECENT CULTURES:      LIVER FUNCTIONS - ( 10 Jul 2017 08:11 )  Alb: 4.4 g/dL / Pro: 7.5 g/dL / ALK PHOS: 76 U/L / ALT: 12 U/L / AST: 21 U/L / GGT: x           Urinalysis Basic - ( 10 Jul 2017 09:48 )    Color: Yellow / Appearance: Clear / S.005 / pH: x  Gluc: x / Ketone: Negative  / Bili: Negative / Urobili: Negative mg/dL   Blood: x / Protein: 30 mg/dL / Nitrite: Negative   Leuk Esterase: Negative / RBC: x / WBC 0-2   Sq Epi: x / Non Sq Epi: x / Bacteria: x

## 2017-07-12 DIAGNOSIS — R19.7 DIARRHEA, UNSPECIFIED: ICD-10-CM

## 2017-07-12 DIAGNOSIS — R12 HEARTBURN: ICD-10-CM

## 2017-07-12 LAB
ANION GAP SERPL CALC-SCNC: 14 MMOL/L — SIGNIFICANT CHANGE UP (ref 5–17)
BUN SERPL-MCNC: 8 MG/DL — SIGNIFICANT CHANGE UP (ref 8–20)
CALCIUM SERPL-MCNC: 8.7 MG/DL — SIGNIFICANT CHANGE UP (ref 8.6–10.2)
CHLORIDE SERPL-SCNC: 106 MMOL/L — SIGNIFICANT CHANGE UP (ref 98–107)
CO2 SERPL-SCNC: 22 MMOL/L — SIGNIFICANT CHANGE UP (ref 22–29)
CREAT SERPL-MCNC: 1.31 MG/DL — HIGH (ref 0.5–1.3)
CULTURE RESULTS: SIGNIFICANT CHANGE UP
GLUCOSE SERPL-MCNC: 83 MG/DL — SIGNIFICANT CHANGE UP (ref 70–115)
MAGNESIUM SERPL-MCNC: 1.9 MG/DL — SIGNIFICANT CHANGE UP (ref 1.8–2.6)
POTASSIUM SERPL-MCNC: 3.4 MMOL/L — LOW (ref 3.5–5.3)
POTASSIUM SERPL-SCNC: 3.4 MMOL/L — LOW (ref 3.5–5.3)
SODIUM SERPL-SCNC: 142 MMOL/L — SIGNIFICANT CHANGE UP (ref 135–145)
SPECIMEN SOURCE: SIGNIFICANT CHANGE UP
T3 SERPL-MCNC: 85 NG/DL — SIGNIFICANT CHANGE UP (ref 80–200)
T4 AB SER-ACNC: 5.5 UG/DL — SIGNIFICANT CHANGE UP (ref 4.5–12)
TSH SERPL-MCNC: 2.45 UIU/ML — SIGNIFICANT CHANGE UP (ref 0.27–4.2)

## 2017-07-12 PROCEDURE — 74220 X-RAY XM ESOPHAGUS 1CNTRST: CPT | Mod: 26

## 2017-07-12 PROCEDURE — 99232 SBSQ HOSP IP/OBS MODERATE 35: CPT

## 2017-07-12 PROCEDURE — 99233 SBSQ HOSP IP/OBS HIGH 50: CPT

## 2017-07-12 RX ORDER — MORPHINE SULFATE 50 MG/1
4 CAPSULE, EXTENDED RELEASE ORAL EVERY 4 HOURS
Qty: 0 | Refills: 0 | Status: DISCONTINUED | OUTPATIENT
Start: 2017-07-12 | End: 2017-07-13

## 2017-07-12 RX ORDER — POTASSIUM CHLORIDE 20 MEQ
40 PACKET (EA) ORAL ONCE
Qty: 0 | Refills: 0 | Status: COMPLETED | OUTPATIENT
Start: 2017-07-12 | End: 2017-07-12

## 2017-07-12 RX ORDER — PANTOPRAZOLE SODIUM 20 MG/1
40 TABLET, DELAYED RELEASE ORAL
Qty: 0 | Refills: 0 | Status: DISCONTINUED | OUTPATIENT
Start: 2017-07-12 | End: 2017-07-14

## 2017-07-12 RX ADMIN — PANTOPRAZOLE SODIUM 40 MILLIGRAM(S): 20 TABLET, DELAYED RELEASE ORAL at 08:24

## 2017-07-12 RX ADMIN — Medication 100 MILLIGRAM(S): at 05:43

## 2017-07-12 RX ADMIN — ONDANSETRON 4 MILLIGRAM(S): 8 TABLET, FILM COATED ORAL at 23:00

## 2017-07-12 RX ADMIN — SODIUM CHLORIDE 150 MILLILITER(S): 9 INJECTION INTRAMUSCULAR; INTRAVENOUS; SUBCUTANEOUS at 05:43

## 2017-07-12 RX ADMIN — PANTOPRAZOLE SODIUM 40 MILLIGRAM(S): 20 TABLET, DELAYED RELEASE ORAL at 16:22

## 2017-07-12 RX ADMIN — Medication 40 MILLIEQUIVALENT(S): at 10:22

## 2017-07-12 RX ADMIN — ONDANSETRON 4 MILLIGRAM(S): 8 TABLET, FILM COATED ORAL at 05:43

## 2017-07-12 RX ADMIN — SODIUM CHLORIDE 150 MILLILITER(S): 9 INJECTION INTRAMUSCULAR; INTRAVENOUS; SUBCUTANEOUS at 23:00

## 2017-07-12 RX ADMIN — SODIUM CHLORIDE 150 MILLILITER(S): 9 INJECTION INTRAMUSCULAR; INTRAVENOUS; SUBCUTANEOUS at 16:18

## 2017-07-12 RX ADMIN — ONDANSETRON 4 MILLIGRAM(S): 8 TABLET, FILM COATED ORAL at 17:24

## 2017-07-12 RX ADMIN — Medication 100 MILLIGRAM(S): at 01:02

## 2017-07-12 RX ADMIN — ONDANSETRON 4 MILLIGRAM(S): 8 TABLET, FILM COATED ORAL at 01:02

## 2017-07-12 RX ADMIN — ONDANSETRON 4 MILLIGRAM(S): 8 TABLET, FILM COATED ORAL at 11:39

## 2017-07-12 RX ADMIN — Medication 3 MILLILITER(S): at 08:26

## 2017-07-12 NOTE — PROGRESS NOTE ADULT - ASSESSMENT
31 y/o female with PMHx of Asthma, ruptured ovarian cyst s/p ex laparotomy, colitis in 2016, chronic diarrhea for 2-3 years (never being worked up), heartburns lately, presented to ED with abdomen pain for 2 weeks associated with nausea, hematemesis X 4 episodes, chronic diarrhea. Exam unremarkable, labs significant for leukocytosis, ROSALINDA and CT abdomen shows colitis on admission. Pt admitted with impression of hematemesis likely from PUD and colitis. She was initially kept NPO, started on IVFs, Ciprofloxacin and flagyl. GI was consulted and recommendation appreciated. Pt symptoms improved, diet resumed and well tolerated. Pt developed reaction (hives) with ciprofloxacin which was held and flagyl also discontinued per GI less likely infectious etiology given negative C diff and pending stool O&P/Culture.  Esophagram normal.      Colitis:  - Improving, nausea/vomiting resolved. Per patient diarrhea chronic and at baseline. Antibiotics discontinued per GI, less likely infectious. C/w IVFs   -GI consult appreciated, awaiting stool studies result, if negative will need colonoscopy inpatient vs outpatient TBD with GI.    Hematemesis: Resolved  - Chronic smoker with heartburns, nausea, hematemesis, hemodynamically stable for now. No more bleeding episode.   - C/w IVFs, PPI, Zofran prn. Tolerating diet.  - GI consult appreciated.    ROSALINDA in setting of dehydration/vomiting:  - Improving, Cr 1.9-----> 1.3  - C/w IVFs.    H/o Asthma:  - Stable, not on any medication at home. Prn Duoneb.    DVT ppx:  - SCD, no anticoagulation given bloody vomiting.

## 2017-07-12 NOTE — PROGRESS NOTE ADULT - SUBJECTIVE AND OBJECTIVE BOX
Pt seen and examined f/y of abdominal pain, nausea, vomiting, heartburn and diarrhea  This morning she denies any heartburn, nausea or vomiting but still with diarrhea and abdominal cramping. Also c/o sensation of food sitting in subzyphoid area but no actual dysphagia. She says she feels the food in her upper chest and subxyphoid are while eating. Stool for c diff neg. C&S pending as is O&P.    REVIEW OF SYSTEMS:    CONSTITUTIONAL: No fever, weight loss, or fatigue  EYES: No eye pain, visual disturbances, or discharge  ENMT:  No difficulty hearing, tinnitus, vertigo; No sinus or throat pain  RESPIRATORY: No cough, wheezing, chills or hemoptysis; No shortness of breath  CARDIOVASCULAR: No chest pain, palpitations, dizziness, or leg swelling  GASTROINTESTINAL: as above    MEDICATIONS:  MEDICATIONS  (STANDING):  metroNIDAZOLE  IVPB 500 milliGRAM(s) IV Intermittent every 8 hours  ondansetron Injectable 4 milliGRAM(s) IV Push every 6 hours  sodium chloride 0.9% Bolus 500 milliLiter(s) IV Bolus once  sodium chloride 0.9%. 1000 milliLiter(s) (150 mL/Hr) IV Continuous <Continuous>  potassium chloride    Tablet ER 40 milliEquivalent(s) Oral once  pantoprazole    Tablet 40 milliGRAM(s) Oral two times a day before meals    MEDICATIONS  (PRN):  ALBUTerol/ipratropium for Nebulization 3 milliLiter(s) Nebulizer every 6 hours PRN Shortness of Breath  morphine  - Injectable 4 milliGRAM(s) IV Push every 4 hours PRN Moderate Pain (4 - 6)      Allergies    penicillin (Unknown)    Intolerances        Vital Signs Last 24 Hrs  T(C): 36.9 (2017 08:16), Max: 36.9 (2017 18:30)  T(F): 98.5 (2017 08:16), Max: 98.5 (2017 18:30)  HR: 48 (2017 08:16) (48 - 68)  BP: 101/62 (2017 08:16) (89/60 - 145/80)  BP(mean): --  RR: 16 (2017 08:16) (16 - 18)  SpO2: 97% (2017 08:16) (97% - 99%)     @ 07: @ 07:00  --------------------------------------------------------  IN: 900 mL / OUT: 0 mL / NET: 900 mL        PHYSICAL EXAM:    General: Well developed; well nourished; in no acute distress  HEENT: MMM, conjunctiva and sclera clear  Gastrointestinal:Abdomen: Soft non-tender non-distended; Normal bowel sounds; No hepatosplenomegaly  Extremities: no cyanosis, clubbing or edema.  Skin: Warm and dry. No obvious rash    LABS:      CBC Full  -  ( 2017 09:09 )  WBC Count : 12.8 K/uL  Hemoglobin : 12.4 g/dL  Hematocrit : 37.0 %  Platelet Count - Automated : 297 K/uL  Mean Cell Volume : 100.0 fl  Mean Cell Hemoglobin : 33.5 pg  Mean Cell Hemoglobin Concentration : 33.5 g/dL  Auto Neutrophil # : x  Auto Lymphocyte # : x  Auto Monocyte # : x  Auto Eosinophil # : x  Auto Basophil # : x  Auto Neutrophil % : x  Auto Lymphocyte % : x  Auto Monocyte % : x  Auto Eosinophil % : x  Auto Basophil % : x        142  |  106  |  8.0  ----------------------------<  83  3.4<L>   |  22.0  |  1.31<H>    Ca    8.7      2017 05:51  Mg     1.9     -12            Urinalysis Basic - ( 10 Jul 2017 09:48 )    Color: Yellow / Appearance: Clear / S.005 / pH: x  Gluc: x / Ketone: Negative  / Bili: Negative / Urobili: Negative mg/dL   Blood: x / Protein: 30 mg/dL / Nitrite: Negative   Leuk Esterase: Negative / RBC: x / WBC 0-2   Sq Epi: x / Non Sq Epi: x / Bacteria: x

## 2017-07-12 NOTE — PROGRESS NOTE ADULT - SUBJECTIVE AND OBJECTIVE BOX
PAMELA ROBLERO Female 32y [id number]    Pt seen and examined at bedside, no over night event reported by night staff. Pt reports doing well, reports nausea/vomiting has been resolved but diarrhea persist which is at baseline as per patient.    CONSTITUTIONAL: No fever, chills, fatigue.  RESPIRATORY: No cough, SOB, phlegm  CARDIOVASCULAR: No chest pain, palpitations  GASTROINTESTINAL: +ve diarrhea, no abdominal pain, No nausea or vomiting.  NEUROLOGICAL: No headaches,  loss of strength.  MISCELLANEOUS: No joint swelling or pain.        Physical Exam:    Vital Signs Last 24 Hrs  T(C): 36.6 (12 Jul 2017 11:40), Max: 36.9 (12 Jul 2017 08:16)  T(F): 97.8 (12 Jul 2017 11:40), Max: 98.5 (12 Jul 2017 08:16)  HR: 50 (12 Jul 2017 11:40) (48 - 68)  BP: 98/63 (12 Jul 2017 11:40) (98/63 - 145/80)  BP(mean): --  RR: 16 (12 Jul 2017 11:40) (16 - 18)  SpO2: 99% (12 Jul 2017 11:40) (97% - 99%)    GENERAL: Young female lying comfortably on chair, NAD  HEENT: PERRL, +EOMI  NECK: soft, Supple, No JVD,   CHEST/LUNG: Clear to auscultate bilaterally; No wheezing  HEART: S1S2+, Regular rate and rhythm; No murmurs, no tachycardia.  ABDOMEN: Soft, Nontender, Nondistended; Bowel sounds present.  EXTREMITIES:  2+ Peripheral Pulses, No edema  SKIN: No rashes or lesions  NEURO: AAOX3, no focal deficits, no motor r sensory loss  PSYCH: normal mood          MEDICATIONS  (STANDING):  ondansetron Injectable 4 milliGRAM(s) IV Push every 6 hours  sodium chloride 0.9% Bolus 500 milliLiter(s) IV Bolus once  sodium chloride 0.9%. 1000 milliLiter(s) (150 mL/Hr) IV Continuous <Continuous>  pantoprazole    Tablet 40 milliGRAM(s) Oral two times a day before meals    MEDICATIONS  (PRN):  ALBUTerol/ipratropium for Nebulization 3 milliLiter(s) Nebulizer every 6 hours PRN Shortness of Breath  morphine  - Injectable 4 milliGRAM(s) IV Push every 4 hours PRN Moderate Pain (4 - 6)        Labs:  LABS:                        12.4   12.8  )-----------( 297      ( 11 Jul 2017 09:09 )             37.0     07-12    142  |  106  |  8.0  ----------------------------<  83  3.4<L>   |  22.0  |  1.31<H>    Ca    8.7      12 Jul 2017 05:51  Mg     1.9     07-12        RECENT CULTURES:

## 2017-07-12 NOTE — PROGRESS NOTE ADULT - PROBLEM SELECTOR PLAN 2
Significance unclear and appear to have resolved although now she has some unusual complaints of ? dysphagia but atypical and doubt food actually lodging in esophagus. Will obtaine esophagram and increase pantoprazole to BID

## 2017-07-13 DIAGNOSIS — K21.9 GASTRO-ESOPHAGEAL REFLUX DISEASE WITHOUT ESOPHAGITIS: ICD-10-CM

## 2017-07-13 LAB
ANION GAP SERPL CALC-SCNC: 14 MMOL/L — SIGNIFICANT CHANGE UP (ref 5–17)
BUN SERPL-MCNC: 5 MG/DL — LOW (ref 8–20)
CALCIUM SERPL-MCNC: 8.2 MG/DL — LOW (ref 8.6–10.2)
CHLORIDE SERPL-SCNC: 104 MMOL/L — SIGNIFICANT CHANGE UP (ref 98–107)
CO2 SERPL-SCNC: 20 MMOL/L — LOW (ref 22–29)
CREAT SERPL-MCNC: 0.86 MG/DL — SIGNIFICANT CHANGE UP (ref 0.5–1.3)
CULTURE RESULTS: SIGNIFICANT CHANGE UP
GLUCOSE SERPL-MCNC: 97 MG/DL — SIGNIFICANT CHANGE UP (ref 70–115)
HCT VFR BLD CALC: 34.1 % — LOW (ref 37–47)
HGB BLD-MCNC: 11.3 G/DL — LOW (ref 12–16)
INR BLD: 1.27 RATIO — HIGH (ref 0.88–1.16)
MCHC RBC-ENTMCNC: 33.1 G/DL — SIGNIFICANT CHANGE UP (ref 32–36)
MCHC RBC-ENTMCNC: 33.2 PG — HIGH (ref 27–31)
MCV RBC AUTO: 100.3 FL — HIGH (ref 81–99)
PLATELET # BLD AUTO: 244 K/UL — SIGNIFICANT CHANGE UP (ref 150–400)
POTASSIUM SERPL-MCNC: 3.4 MMOL/L — LOW (ref 3.5–5.3)
POTASSIUM SERPL-SCNC: 3.4 MMOL/L — LOW (ref 3.5–5.3)
PROTHROM AB SERPL-ACNC: 14 SEC — HIGH (ref 9.8–12.7)
RBC # BLD: 3.4 M/UL — LOW (ref 4.4–5.2)
RBC # FLD: 13 % — SIGNIFICANT CHANGE UP (ref 11–15.6)
SODIUM SERPL-SCNC: 138 MMOL/L — SIGNIFICANT CHANGE UP (ref 135–145)
SPECIMEN SOURCE: SIGNIFICANT CHANGE UP
WBC # BLD: 9.4 K/UL — SIGNIFICANT CHANGE UP (ref 4.8–10.8)
WBC # FLD AUTO: 9.4 K/UL — SIGNIFICANT CHANGE UP (ref 4.8–10.8)

## 2017-07-13 PROCEDURE — 99232 SBSQ HOSP IP/OBS MODERATE 35: CPT

## 2017-07-13 RX ORDER — ONDANSETRON 8 MG/1
4 TABLET, FILM COATED ORAL ONCE
Qty: 0 | Refills: 0 | Status: DISCONTINUED | OUTPATIENT
Start: 2017-07-13 | End: 2017-07-14

## 2017-07-13 RX ORDER — SODIUM CHLORIDE 9 MG/ML
1000 INJECTION INTRAMUSCULAR; INTRAVENOUS; SUBCUTANEOUS
Qty: 0 | Refills: 0 | Status: DISCONTINUED | OUTPATIENT
Start: 2017-07-13 | End: 2017-07-14

## 2017-07-13 RX ORDER — MORPHINE SULFATE 50 MG/1
2 CAPSULE, EXTENDED RELEASE ORAL EVERY 6 HOURS
Qty: 0 | Refills: 0 | Status: DISCONTINUED | OUTPATIENT
Start: 2017-07-13 | End: 2017-07-14

## 2017-07-13 RX ORDER — POTASSIUM CHLORIDE 20 MEQ
40 PACKET (EA) ORAL ONCE
Qty: 0 | Refills: 0 | Status: COMPLETED | OUTPATIENT
Start: 2017-07-13 | End: 2017-07-13

## 2017-07-13 RX ADMIN — PANTOPRAZOLE SODIUM 40 MILLIGRAM(S): 20 TABLET, DELAYED RELEASE ORAL at 17:14

## 2017-07-13 RX ADMIN — Medication 40 MILLIEQUIVALENT(S): at 12:19

## 2017-07-13 RX ADMIN — SODIUM CHLORIDE 150 MILLILITER(S): 9 INJECTION INTRAMUSCULAR; INTRAVENOUS; SUBCUTANEOUS at 06:16

## 2017-07-13 RX ADMIN — ONDANSETRON 4 MILLIGRAM(S): 8 TABLET, FILM COATED ORAL at 06:15

## 2017-07-13 RX ADMIN — SODIUM CHLORIDE 75 MILLILITER(S): 9 INJECTION INTRAMUSCULAR; INTRAVENOUS; SUBCUTANEOUS at 12:19

## 2017-07-13 RX ADMIN — PANTOPRAZOLE SODIUM 40 MILLIGRAM(S): 20 TABLET, DELAYED RELEASE ORAL at 06:15

## 2017-07-13 NOTE — PROGRESS NOTE ADULT - SUBJECTIVE AND OBJECTIVE BOX
PAMELA ROBLERO Female 32y [id number]    Chief complaints: Diarrhea    Pt seen and examined at bedside, no over night event reported by night staff. Pt continue to have diarrhea 4-6 X a day, nausea/vomiting resolved.     CONSTITUTIONAL: No fever, chills, fatigue.  RESPIRATORY: No cough, SOB, phlegm  CARDIOVASCULAR: No chest pain, palpitations  GASTROINTESTINAL: +ve diarrhea, No abdominal pain, No nausea or vomiting.  NEUROLOGICAL: No headaches,  loss of strength.  MISCELLANEOUS: No joint swelling or pain.        Physical Exam:    Vital Signs Last 24 Hrs  T(C): 36.6 (13 Jul 2017 07:51), Max: 36.9 (12 Jul 2017 21:52)  T(F): 97.8 (13 Jul 2017 07:51), Max: 98.4 (12 Jul 2017 21:52)  HR: 45 (13 Jul 2017 07:51) (45 - 50)  BP: 109/58 (13 Jul 2017 07:51) (100/63 - 110/70)  BP(mean): --  RR: 16 (13 Jul 2017 07:51) (16 - 16)  SpO2: 97% (13 Jul 2017 07:51) (97% - 99%)    GENERAL: Young female lying comfortably on bed, NAD  HEENT: PERRL, +EOMI  NECK: soft, Supple, No JVD,   CHEST/LUNG: Clear to auscultate bilaterally; No wheezing  HEART: S1S2+, Regular rate and rhythm; No murmurs, no tachycardia.  ABDOMEN: Soft, Nontender, Nondistended; Bowel sounds present.  EXTREMITIES:  2+ Peripheral Pulses, No edema  SKIN: No rashes or lesions  NEURO: AAOX3, no focal deficits, no motor r sensory loss  PSYCH: normal mood      MEDICATIONS  (STANDING):  sodium chloride 0.9% Bolus 500 milliLiter(s) IV Bolus once  pantoprazole    Tablet 40 milliGRAM(s) Oral two times a day before meals  sodium chloride 0.9%. 1000 milliLiter(s) (75 mL/Hr) IV Continuous <Continuous>    MEDICATIONS  (PRN):  ALBUTerol/ipratropium for Nebulization 3 milliLiter(s) Nebulizer every 6 hours PRN Shortness of Breath  morphine  - Injectable 2 milliGRAM(s) IV Push every 6 hours PRN Moderate Pain (4 - 6)  ondansetron Injectable 4 milliGRAM(s) IV Push once PRN Nausea and/or Vomiting        Labs:  LABS:                        11.3   9.4   )-----------( 244      ( 13 Jul 2017 07:00 )             34.1     07-13    138  |  104  |  5.0<L>  ----------------------------<  97  3.4<L>   |  20.0<L>  |  0.86    Ca    8.2<L>      13 Jul 2017 07:00  Mg     1.9     07-12      PT/INR - ( 13 Jul 2017 07:00 )   PT: 14.0 sec;   INR: 1.27 ratio           RECENT CULTURES:

## 2017-07-13 NOTE — PROGRESS NOTE ADULT - SUBJECTIVE AND OBJECTIVE BOX
Patient is a 32y old  Female who presents with a chief complaint of     HPI:  Pt is 33 y/o female with PMHx of Asthma, - gets intermittent diarrhea for years. Patient admitted with diarrhea, nausea, vomiting. Nausea and vomiting resolved. Sometimes dysphagia to solid. However esophagram was normal.  Still with 6-7 loose stools yesterday.  K was low. Giardia and C diff was normal. C +S pending.       REVIEW OF SYSTEMS:  Constitutional: No fever, weight loss or fatigue  ENMT:  No difficulty hearing, tinnitus, vertigo; No sinus or throat pain  Respiratory: No cough, wheezing, chills or hemoptysis  Cardiovascular: No chest pain, palpitations, dizziness or leg swelling  Gastrointestinal: + cramping pain. No nausea, vomiting or hematemesis;+diarrhea  Skin: No itching, burning, rashes or lesions   Musculoskeletal: No joint pain or swelling; No muscle, back or extremity pain    PAST MEDICAL & SURGICAL HISTORY:  Acute renal failure  Colitis  Herpes  Asthma  Ovarian cyst  H/O exploratory laparotomy: s/p ovarian cyst rupture affecting artery      FAMILY HISTORY:  Family history of colitis  Family history of irritable bowel syndrome (Sibling)      SOCIAL HISTORY:  Smoking Status: [ ] Current, [ ] Former, [ ] Never  Pack Years:  [  ] EtOH- denies  [  ] IVDA- denies    MEDICATIONS:  MEDICATIONS  (STANDING):  ondansetron Injectable 4 milliGRAM(s) IV Push every 6 hours  sodium chloride 0.9% Bolus 500 milliLiter(s) IV Bolus once  sodium chloride 0.9%. 1000 milliLiter(s) (150 mL/Hr) IV Continuous <Continuous>  pantoprazole    Tablet 40 milliGRAM(s) Oral two times a day before meals  potassium chloride    Tablet ER 40 milliEquivalent(s) Oral once    MEDICATIONS  (PRN):  ALBUTerol/ipratropium for Nebulization 3 milliLiter(s) Nebulizer every 6 hours PRN Shortness of Breath  morphine  - Injectable 4 milliGRAM(s) IV Push every 4 hours PRN Moderate Pain (4 - 6)      Allergies    Cipro (Hives)  penicillin (Unknown)    Intolerances        Vital Signs Last 24 Hrs  T(C): 36.6 (13 Jul 2017 07:51), Max: 36.9 (12 Jul 2017 21:52)  T(F): 97.8 (13 Jul 2017 07:51), Max: 98.4 (12 Jul 2017 21:52)  HR: 45 (13 Jul 2017 07:51) (45 - 50)  BP: 109/58 (13 Jul 2017 07:51) (98/63 - 110/70)  BP(mean): --  RR: 16 (13 Jul 2017 07:51) (16 - 16)  SpO2: 97% (13 Jul 2017 07:51) (97% - 99%)    07-12 @ 07:01  -  07-13 @ 07:00  --------------------------------------------------------  IN: 1350 mL / OUT: 0 mL / NET: 1350 mL          PHYSICAL EXAM:    General: Well developed; well nourished; in no acute distress  HEENT: MMM, conjunctiva and sclera clear  H- RRR  Lungs- CTA  Gastrointestinal: Soft, non-tender non-distended; Normal bowel sounds; No rebound or guarding  Extremities: Normal range of motion, No clubbing, cyanosis or edema  Neurological: Alert and oriented x3  Skin: Warm and dry. No obvious rash      LABS:                        11.3   9.4   )-----------( 244      ( 13 Jul 2017 07:00 )             34.1     13 Jul 2017 07:00    138    |  104    |  5.0    ----------------------------<  97     3.4     |  20.0   |  0.86     Ca    8.2        13 Jul 2017 07:00  Mg     1.9       12 Jul 2017 05:51                RADIOLOGY & ADDITIONAL STUDIES:     < from: CT Abdomen and Pelvis w/ Oral Cont and w/ IV Cont (07.10.17 @ 12:09) >  IMPRESSION:        Pancolitis, most severe involving the sigmoid colon.      < end of copied text >

## 2017-07-13 NOTE — PROGRESS NOTE ADULT - PROBLEM SELECTOR PLAN 1
Patient continues with diarrhea. Awaiting Stool C+S    If negative, will do colonoscopy, possibly in am ( pt did not eat breakfast . I will switch back to clear liquids in case the stool studies come back today

## 2017-07-13 NOTE — PROGRESS NOTE ADULT - ASSESSMENT
31 y/o female with PMHx of Asthma, ruptured ovarian cyst s/p ex laparotomy, colitis in 2016, chronic diarrhea for 2-3 years (never being worked up), heartburns lately, presented to ED with abdomen pain for 2 weeks associated with nausea, hematemesis X 4 episodes, chronic diarrhea. Exam unremarkable, labs significant for leukocytosis, ROSALINDA and CT abdomen shows colitis on admission. Pt admitted with impression of Colitis and hematemesis likely from PUD. She was initially kept NPO, started on IVFs and Ciprofloxacin and flagyl. GI was consulted and recommendation appreciated. Pt symptoms nausea/vomiting improved, diet resumed and well tolerated. Pt developed reaction (hives) with ciprofloxacin which was held and flagyl also discontinued per GI less likely infectious etiology given negative C diff and pending stool O&P/Culture. If stool studies negative then she will get colonoscopy per GI. Esophagram normal.      Colitis:  -  Persistent diarrhea, resolved nausea/vomiting. Per patient diarrhea chronic and at baseline. Antibiotics discontinued per GI, less likely infectious, stool work up pending result, c/w IVFs  -GI consult appreciated, awaiting stool studies result, if negative will need colonoscopy.     Hematemesis: Resolved  - Chronic smoker with heartburns, nausea, hematemesis, hemodynamically stable for now. No more bleeding episode.   - C/w IVFs, PPI, Zofran prn. Tolerating diet.  - GI consult appreciated.    ROSALINDA in setting of dehydration/vomiting:  - Resolved      H/o Asthma:  - Stable, not on any medication at home. Prn Duoneb.    DVT ppx:  - SCD.

## 2017-07-14 ENCOUNTER — TRANSCRIPTION ENCOUNTER (OUTPATIENT)
Age: 32
End: 2017-07-14

## 2017-07-14 VITALS
HEART RATE: 45 BPM | TEMPERATURE: 98 F | DIASTOLIC BLOOD PRESSURE: 52 MMHG | RESPIRATION RATE: 18 BRPM | SYSTOLIC BLOOD PRESSURE: 95 MMHG

## 2017-07-14 LAB
ANION GAP SERPL CALC-SCNC: 13 MMOL/L — SIGNIFICANT CHANGE UP (ref 5–17)
BUN SERPL-MCNC: 3 MG/DL — LOW (ref 8–20)
CALCIUM SERPL-MCNC: 8.6 MG/DL — SIGNIFICANT CHANGE UP (ref 8.6–10.2)
CHLORIDE SERPL-SCNC: 103 MMOL/L — SIGNIFICANT CHANGE UP (ref 98–107)
CO2 SERPL-SCNC: 24 MMOL/L — SIGNIFICANT CHANGE UP (ref 22–29)
CREAT SERPL-MCNC: 0.6 MG/DL — SIGNIFICANT CHANGE UP (ref 0.5–1.3)
GLUCOSE SERPL-MCNC: 81 MG/DL — SIGNIFICANT CHANGE UP (ref 70–115)
HCT VFR BLD CALC: 34.6 % — LOW (ref 37–47)
HGB BLD-MCNC: 11.8 G/DL — LOW (ref 12–16)
MAGNESIUM SERPL-MCNC: 1.3 MG/DL — LOW (ref 1.6–2.6)
MCHC RBC-ENTMCNC: 33.1 PG — HIGH (ref 27–31)
MCHC RBC-ENTMCNC: 34.1 G/DL — SIGNIFICANT CHANGE UP (ref 32–36)
MCV RBC AUTO: 97.2 FL — SIGNIFICANT CHANGE UP (ref 81–99)
PLATELET # BLD AUTO: 266 K/UL — SIGNIFICANT CHANGE UP (ref 150–400)
POTASSIUM SERPL-MCNC: 3.2 MMOL/L — LOW (ref 3.5–5.3)
POTASSIUM SERPL-SCNC: 3.2 MMOL/L — LOW (ref 3.5–5.3)
RBC # BLD: 3.56 M/UL — LOW (ref 4.4–5.2)
RBC # FLD: 13.1 % — SIGNIFICANT CHANGE UP (ref 11–15.6)
SODIUM SERPL-SCNC: 140 MMOL/L — SIGNIFICANT CHANGE UP (ref 135–145)
WBC # BLD: 8 K/UL — SIGNIFICANT CHANGE UP (ref 4.8–10.8)
WBC # FLD AUTO: 8 K/UL — SIGNIFICANT CHANGE UP (ref 4.8–10.8)

## 2017-07-14 PROCEDURE — 74220 X-RAY XM ESOPHAGUS 1CNTRST: CPT

## 2017-07-14 PROCEDURE — 76705 ECHO EXAM OF ABDOMEN: CPT

## 2017-07-14 PROCEDURE — 99285 EMERGENCY DEPT VISIT HI MDM: CPT | Mod: 25

## 2017-07-14 PROCEDURE — 84436 ASSAY OF TOTAL THYROXINE: CPT

## 2017-07-14 PROCEDURE — 84480 ASSAY TRIIODOTHYRONINE (T3): CPT

## 2017-07-14 PROCEDURE — 87040 BLOOD CULTURE FOR BACTERIA: CPT

## 2017-07-14 PROCEDURE — 71046 X-RAY EXAM CHEST 2 VIEWS: CPT

## 2017-07-14 PROCEDURE — 81001 URINALYSIS AUTO W/SCOPE: CPT

## 2017-07-14 PROCEDURE — 87493 C DIFF AMPLIFIED PROBE: CPT

## 2017-07-14 PROCEDURE — 36415 COLL VENOUS BLD VENIPUNCTURE: CPT

## 2017-07-14 PROCEDURE — 83735 ASSAY OF MAGNESIUM: CPT

## 2017-07-14 PROCEDURE — 84702 CHORIONIC GONADOTROPIN TEST: CPT

## 2017-07-14 PROCEDURE — 87046 STOOL CULTR AEROBIC BACT EA: CPT

## 2017-07-14 PROCEDURE — 85730 THROMBOPLASTIN TIME PARTIAL: CPT

## 2017-07-14 PROCEDURE — 99232 SBSQ HOSP IP/OBS MODERATE 35: CPT

## 2017-07-14 PROCEDURE — 94640 AIRWAY INHALATION TREATMENT: CPT

## 2017-07-14 PROCEDURE — 86900 BLOOD TYPING SEROLOGIC ABO: CPT

## 2017-07-14 PROCEDURE — 80048 BASIC METABOLIC PNL TOTAL CA: CPT

## 2017-07-14 PROCEDURE — 84443 ASSAY THYROID STIM HORMONE: CPT

## 2017-07-14 PROCEDURE — 87329 GIARDIA AG IA: CPT

## 2017-07-14 PROCEDURE — 85027 COMPLETE CBC AUTOMATED: CPT

## 2017-07-14 PROCEDURE — 87045 FECES CULTURE AEROBIC BACT: CPT

## 2017-07-14 PROCEDURE — 83605 ASSAY OF LACTIC ACID: CPT

## 2017-07-14 PROCEDURE — 96374 THER/PROPH/DIAG INJ IV PUSH: CPT | Mod: XU

## 2017-07-14 PROCEDURE — 87177 OVA AND PARASITES SMEARS: CPT

## 2017-07-14 PROCEDURE — 80053 COMPREHEN METABOLIC PANEL: CPT

## 2017-07-14 PROCEDURE — 85610 PROTHROMBIN TIME: CPT

## 2017-07-14 PROCEDURE — 96375 TX/PRO/DX INJ NEW DRUG ADDON: CPT

## 2017-07-14 PROCEDURE — 86364 TISS TRNSGLTMNASE EA IG CLAS: CPT

## 2017-07-14 PROCEDURE — 86901 BLOOD TYPING SEROLOGIC RH(D): CPT

## 2017-07-14 PROCEDURE — 99238 HOSP IP/OBS DSCHRG MGMT 30/<: CPT

## 2017-07-14 PROCEDURE — 83690 ASSAY OF LIPASE: CPT

## 2017-07-14 PROCEDURE — 74177 CT ABD & PELVIS W/CONTRAST: CPT

## 2017-07-14 PROCEDURE — 86850 RBC ANTIBODY SCREEN: CPT

## 2017-07-14 RX ORDER — PANTOPRAZOLE SODIUM 20 MG/1
1 TABLET, DELAYED RELEASE ORAL
Qty: 30 | Refills: 0 | OUTPATIENT
Start: 2017-07-14 | End: 2017-08-13

## 2017-07-14 RX ORDER — MAGNESIUM OXIDE 400 MG ORAL TABLET 241.3 MG
1 TABLET ORAL
Qty: 10 | Refills: 0 | OUTPATIENT
Start: 2017-07-14 | End: 2017-07-19

## 2017-07-14 RX ORDER — POTASSIUM CHLORIDE 20 MEQ
20 PACKET (EA) ORAL ONCE
Qty: 0 | Refills: 0 | Status: COMPLETED | OUTPATIENT
Start: 2017-07-14 | End: 2017-07-14

## 2017-07-14 RX ORDER — MAGNESIUM SULFATE 500 MG/ML
4 VIAL (ML) INJECTION ONCE
Qty: 0 | Refills: 0 | Status: COMPLETED | OUTPATIENT
Start: 2017-07-14 | End: 2017-07-14

## 2017-07-14 RX ORDER — POTASSIUM CHLORIDE 20 MEQ
40 PACKET (EA) ORAL ONCE
Qty: 0 | Refills: 0 | Status: COMPLETED | OUTPATIENT
Start: 2017-07-14 | End: 2017-07-14

## 2017-07-14 RX ORDER — POTASSIUM CHLORIDE 20 MEQ
1 PACKET (EA) ORAL
Qty: 4 | Refills: 0 | OUTPATIENT
Start: 2017-07-14 | End: 2017-07-18

## 2017-07-14 RX ADMIN — Medication 100 GRAM(S): at 10:41

## 2017-07-14 RX ADMIN — PANTOPRAZOLE SODIUM 40 MILLIGRAM(S): 20 TABLET, DELAYED RELEASE ORAL at 06:46

## 2017-07-14 RX ADMIN — SODIUM CHLORIDE 75 MILLILITER(S): 9 INJECTION INTRAMUSCULAR; INTRAVENOUS; SUBCUTANEOUS at 13:59

## 2017-07-14 RX ADMIN — SODIUM CHLORIDE 75 MILLILITER(S): 9 INJECTION INTRAMUSCULAR; INTRAVENOUS; SUBCUTANEOUS at 00:33

## 2017-07-14 RX ADMIN — Medication 40 MILLIEQUIVALENT(S): at 09:24

## 2017-07-14 RX ADMIN — Medication 20 MILLIEQUIVALENT(S): at 09:24

## 2017-07-14 NOTE — DISCHARGE NOTE ADULT - CARE PLAN
Principal Discharge DX:	Colitis  Goal:	To prevent recurrent attack.  Instructions for follow-up, activity and diet:	Keep your-self well hydrated. Follow up with GI Dr Good for colonoscopy as an outpatient.  Secondary Diagnosis:	Gastroesophageal reflux disease without esophagitis  Instructions for follow-up, activity and diet:	C/w Pantoprazole or nexium. If any bloody vomiting recurs go to ED.  Secondary Diagnosis:	Hematemesis with nausea  Goal:	Resolved  Instructions for follow-up, activity and diet:	Resolved  Secondary Diagnosis:	Acute renal failure, unspecified acute renal failure type  Goal:	Resolved  Secondary Diagnosis:	Hypokalemia  Instructions for follow-up, activity and diet:	Let your PCP to check electrolytes in 1 week.  Secondary Diagnosis:	Hypomagnesemia  Instructions for follow-up, activity and diet:	Let your PCP to check electrolytes in 1 week.

## 2017-07-14 NOTE — DISCHARGE NOTE ADULT - PLAN OF CARE
To prevent recurrent attack. Keep your-self well hydrated. Follow up with GI Dr Good for colonoscopy as an outpatient. C/w Pantoprazole or nexium. If any bloody vomiting recurs go to ED. Resolved Let your PCP to check electrolytes in 1 week.

## 2017-07-14 NOTE — DISCHARGE NOTE ADULT - MEDICATION SUMMARY - MEDICATIONS TO TAKE
I will START or STAY ON the medications listed below when I get home from the hospital:    pantoprazole 40 mg oral delayed release tablet  -- 1 tab(s) by mouth once a day  -- Indication: For Gastroesophageal reflux disease without esophagitis I will START or STAY ON the medications listed below when I get home from the hospital:    magnesium oxide 500 mg oral tablet  -- 1 tab(s) by mouth 2 times a day  -- Indication: For Low magnesium    K-Tab 20 mEq oral tablet, extended release  -- 1 tab(s) by mouth once a day  -- It is very important that you take or use this exactly as directed.  Do not skip doses or discontinue unless directed by your doctor.  Medication should be taken with plenty of water.  Take with food or milk.    -- Indication: For Hypokalemia    pantoprazole 40 mg oral delayed release tablet  -- 1 tab(s) by mouth once a day  -- Indication: For Gastroesophageal reflux disease without esophagitis

## 2017-07-14 NOTE — DISCHARGE NOTE ADULT - HOSPITAL COURSE
31 y/o female with PMHx of Asthma, ruptured ovarian cyst s/p ex laparotomy, colitis in 2016, chronic diarrhea for 2-3 years (never being worked up), heartburns lately, presented to ED with abdomen pain for 2 weeks associated with nausea, hematemesis X 4 episodes, chronic diarrhea. Exam unremarkable, labs significant for leukocytosis, ROSALINDA and CT abdomen shows colitis on admission. Pt admitted with impression of Colitis and hematemesis likely from PUD. She was initially kept NPO, started on IVFs and Ciprofloxacin and flagyl. GI was consulted and recommendation appreciated. Pt symptoms nausea/vomiting improved, diet resumed and well tolerated. Pt developed reaction (hives) with ciprofloxacin which was held and flagyl also discontinued per GI less likely infectious etiology given negative stool C&S, C diff, Giardia, pending stool O&P/Culture. Pt also noted to have hypokalemia/hypomagnessemia and electrolytes were replaced. Esophagram normal.  ROSALINDA and hematemesis resolved. GI recommended colonoscopy given chronic diarrhea but patient did not want to stay over the weekend and she wants colonoscopy as an outpatient. GI followed up this morning  and patient deemed stable for D/C from GI standpoint and f/u with Dr. Good as outpatient for colonoscopy.     Vital Signs Last 24 Hrs  T(C): 36.9 (14 Jul 2017 04:55), Max: 36.9 (13 Jul 2017 17:06)  T(F): 98.4 (14 Jul 2017 04:55), Max: 98.5 (13 Jul 2017 17:06)  HR: 45 (14 Jul 2017 04:55) (44 - 45)  BP: 95/52 (14 Jul 2017 04:55) (95/52 - 125/66)  BP(mean): --  RR: 18 (14 Jul 2017 04:55) (16 - 18)  SpO2: 97% (13 Jul 2017 17:06) (97% - 97%)     GENERAL: Young female lying comfortably on bed, NAD  HEENT: PERRL, +EOMI  NECK: soft, Supple, No JVD,   CHEST/LUNG: Clear to auscultate bilaterally; No wheezing  HEART: S1S2+, Regular rate and rhythm; No murmurs, no tachycardia.  ABDOMEN: Soft, Nontender, Nondistended; Bowel sounds present.  EXTREMITIES:  2+ Peripheral Pulses, No edema  SKIN: No rashes or lesions  NEURO: AAOX3, no focal deficits, no motor r sensory loss  PSYCH: normal mood

## 2017-07-14 NOTE — PROGRESS NOTE ADULT - PROBLEM SELECTOR PLAN 2
probably IBS but IBD still needs to be excluded. OK for D/C from GI standpoint and f/u with Dr. Good as outpatient for colonoscopy.

## 2017-07-14 NOTE — DISCHARGE NOTE ADULT - PATIENT PORTAL LINK FT
“You can access the [facility] Patient Portal, offered by [facility], by registering with the following website: [email or web address]”

## 2017-07-14 NOTE — DISCHARGE NOTE ADULT - ADDITIONAL INSTRUCTIONS
Repeat BMP and serum magnesium level in 1 week.  Follow up with PCP and GI. Take magnesium oxide for 5 days and KCL for 3-4 days.  Repeat BMP and serum magnesium level in 1 week.  Follow up with PCP and GI.

## 2017-07-14 NOTE — DISCHARGE NOTE ADULT - SECONDARY DIAGNOSIS.
Gastroesophageal reflux disease without esophagitis Hematemesis with nausea Acute renal failure, unspecified acute renal failure type Hypokalemia Hypomagnesemia

## 2017-07-14 NOTE — PROGRESS NOTE ADULT - SUBJECTIVE AND OBJECTIVE BOX
Pt seen and examined f/u diarrhea  This morning she still has diarrhea but back to her baseline chronic symptoms. No nausea, vomiting or abdominal pain. Stool C&S, C diff and giardia antigen neg. O&P pending. She wants to go home and get colonoscopy as outpt.    REVIEW OF SYSTEMS:    CONSTITUTIONAL: No fever, weight loss, or fatigue  EYES: No eye pain, visual disturbances, or discharge  ENMT:  No difficulty hearing, tinnitus, vertigo; No sinus or throat pain  RESPIRATORY: No cough, wheezing, chills or hemoptysis; No shortness of breath  CARDIOVASCULAR: No chest pain, palpitations, dizziness, or leg swelling  GASTROINTESTINAL: as above    MEDICATIONS:  MEDICATIONS  (STANDING):  sodium chloride 0.9% Bolus 500 milliLiter(s) IV Bolus once  pantoprazole    Tablet 40 milliGRAM(s) Oral two times a day before meals  sodium chloride 0.9%. 1000 milliLiter(s) (75 mL/Hr) IV Continuous <Continuous>  magnesium sulfate  IVPB 4 Gram(s) IV Intermittent once    MEDICATIONS  (PRN):  ALBUTerol/ipratropium for Nebulization 3 milliLiter(s) Nebulizer every 6 hours PRN Shortness of Breath  morphine  - Injectable 2 milliGRAM(s) IV Push every 6 hours PRN Moderate Pain (4 - 6)  ondansetron Injectable 4 milliGRAM(s) IV Push once PRN Nausea and/or Vomiting      Allergies    Cipro (Hives)  penicillin (Unknown)    Intolerances        Vital Signs Last 24 Hrs  T(C): 36.9 (14 Jul 2017 04:55), Max: 36.9 (13 Jul 2017 17:06)  T(F): 98.4 (14 Jul 2017 04:55), Max: 98.5 (13 Jul 2017 17:06)  HR: 45 (14 Jul 2017 04:55) (44 - 45)  BP: 95/52 (14 Jul 2017 04:55) (95/52 - 125/66)  BP(mean): --  RR: 18 (14 Jul 2017 04:55) (16 - 18)  SpO2: 97% (13 Jul 2017 17:06) (97% - 97%)    07-13 @ 07:01  -  07-14 @ 07:00  --------------------------------------------------------  IN: 1175 mL / OUT: 0 mL / NET: 1175 mL        PHYSICAL EXAM:    General: Well developed; well nourished; in no acute distress  HEENT: MMM, conjunctiva and sclera clear  Gastrointestinal:Abdomen: Soft non-tender non-distended; Normal bowel sounds; No hepatosplenomegaly  Extremities: no cyanosis, clubbing or edema.  Skin: Warm and dry. No obvious rash    LABS:      CBC Full  -  ( 14 Jul 2017 05:53 )  WBC Count : 8.0 K/uL  Hemoglobin : 11.8 g/dL  Hematocrit : 34.6 %  Platelet Count - Automated : 266 K/uL  Mean Cell Volume : 97.2 fl  Mean Cell Hemoglobin : 33.1 pg  Mean Cell Hemoglobin Concentration : 34.1 g/dL  Auto Neutrophil # : x  Auto Lymphocyte # : x  Auto Monocyte # : x  Auto Eosinophil # : x  Auto Basophil # : x  Auto Neutrophil % : x  Auto Lymphocyte % : x  Auto Monocyte % : x  Auto Eosinophil % : x  Auto Basophil % : x    07-14    140  |  103  |  3.0<L>  ----------------------------<  81  3.2<L>   |  24.0  |  0.60    Ca    8.6      14 Jul 2017 05:53  Mg     1.3     07-14      PT/INR - ( 13 Jul 2017 07:00 )   PT: 14.0 sec;   INR: 1.27 ratio                           RADIOLOGY & ADDITIONAL STUDIES (The following images were personally reviewed):

## 2017-07-15 ENCOUNTER — TRANSCRIPTION ENCOUNTER (OUTPATIENT)
Age: 32
End: 2017-07-15

## 2017-07-15 LAB
CULTURE RESULTS: SIGNIFICANT CHANGE UP
CULTURE RESULTS: SIGNIFICANT CHANGE UP
SPECIMEN SOURCE: SIGNIFICANT CHANGE UP
SPECIMEN SOURCE: SIGNIFICANT CHANGE UP

## 2017-07-18 PROBLEM — N17.9 ACUTE KIDNEY FAILURE, UNSPECIFIED: Chronic | Status: ACTIVE | Noted: 2017-07-10

## 2017-07-18 PROBLEM — K52.9 NONINFECTIVE GASTROENTERITIS AND COLITIS, UNSPECIFIED: Chronic | Status: ACTIVE | Noted: 2017-07-10

## 2017-07-18 LAB
CULTURE RESULTS: SIGNIFICANT CHANGE UP
SPECIMEN SOURCE: SIGNIFICANT CHANGE UP

## 2017-07-19 LAB
HLA FOR CELIAC DISEASE PNL BLD/T: SIGNIFICANT CHANGE UP

## 2017-07-21 ENCOUNTER — RESULT REVIEW (OUTPATIENT)
Age: 32
End: 2017-07-21

## 2017-07-21 ENCOUNTER — APPOINTMENT (OUTPATIENT)
Dept: GASTROENTEROLOGY | Facility: GI CENTER | Age: 32
End: 2017-07-21

## 2017-07-21 ENCOUNTER — OUTPATIENT (OUTPATIENT)
Dept: OUTPATIENT SERVICES | Facility: HOSPITAL | Age: 32
LOS: 1 days | End: 2017-07-21
Payer: COMMERCIAL

## 2017-07-21 VITALS
RESPIRATION RATE: 12 BRPM | DIASTOLIC BLOOD PRESSURE: 80 MMHG | HEIGHT: 59 IN | BODY MASS INDEX: 27.21 KG/M2 | SYSTOLIC BLOOD PRESSURE: 110 MMHG | HEART RATE: 80 BPM | WEIGHT: 135 LBS | TEMPERATURE: 98 F

## 2017-07-21 DIAGNOSIS — R93.5 ABNORMAL FINDINGS ON DIAGNOSTIC IMAGING OF OTHER ABDOMINAL REGIONS, INCLUDING RETROPERITONEUM: ICD-10-CM

## 2017-07-21 DIAGNOSIS — K51.90 ULCERATIVE COLITIS, UNSPECIFIED, WITHOUT COMPLICATIONS: ICD-10-CM

## 2017-07-21 DIAGNOSIS — Z98.89 OTHER SPECIFIED POSTPROCEDURAL STATES: Chronic | ICD-10-CM

## 2017-07-21 PROCEDURE — 88305 TISSUE EXAM BY PATHOLOGIST: CPT

## 2017-07-21 PROCEDURE — 88305 TISSUE EXAM BY PATHOLOGIST: CPT | Mod: 26

## 2017-07-21 PROCEDURE — 45380 COLONOSCOPY AND BIOPSY: CPT

## 2017-07-21 RX ORDER — MAGNESIUM OXIDE 500 MG
500 TABLET ORAL
Qty: 10 | Refills: 0 | Status: ACTIVE | COMMUNITY
Start: 2017-07-14

## 2017-07-21 RX ORDER — LEVOFLOXACIN 500 MG/1
500 TABLET, FILM COATED ORAL
Qty: 7 | Refills: 0 | Status: ACTIVE | COMMUNITY
Start: 2017-03-02

## 2017-07-21 RX ORDER — VALACYCLOVIR 500 MG/1
500 TABLET, FILM COATED ORAL
Qty: 21 | Refills: 0 | Status: ACTIVE | COMMUNITY
Start: 2017-07-18

## 2017-07-21 RX ORDER — NAPROXEN 250 MG/1
250 TABLET ORAL
Qty: 35 | Refills: 0 | Status: ACTIVE | COMMUNITY
Start: 2017-02-14

## 2017-07-21 RX ORDER — PANTOPRAZOLE 40 MG/1
40 TABLET, DELAYED RELEASE ORAL
Qty: 30 | Refills: 0 | Status: ACTIVE | COMMUNITY
Start: 2017-07-14

## 2017-07-21 RX ORDER — POTASSIUM CHLORIDE 1500 MG/1
20 TABLET, FILM COATED, EXTENDED RELEASE ORAL
Qty: 4 | Refills: 0 | Status: ACTIVE | COMMUNITY
Start: 2017-07-14

## 2017-07-21 RX ORDER — ALBUTEROL SULFATE 2.5 MG/3ML
(2.5 MG/3ML) SOLUTION RESPIRATORY (INHALATION)
Qty: 225 | Refills: 0 | Status: ACTIVE | COMMUNITY
Start: 2017-03-02

## 2017-07-21 RX ORDER — METHYLPREDNISOLONE 4 MG/1
4 TABLET ORAL
Qty: 21 | Refills: 0 | Status: ACTIVE | COMMUNITY
Start: 2017-03-02

## 2017-07-24 LAB — SURGICAL PATHOLOGY FINAL REPORT - CH: SIGNIFICANT CHANGE UP

## 2017-07-27 ENCOUNTER — APPOINTMENT (OUTPATIENT)
Dept: GASTROENTEROLOGY | Facility: CLINIC | Age: 32
End: 2017-07-27

## 2019-09-19 ENCOUNTER — RESULT REVIEW (OUTPATIENT)
Age: 34
End: 2019-09-19

## 2019-10-11 ENCOUNTER — RESULT REVIEW (OUTPATIENT)
Age: 34
End: 2019-10-11

## 2020-01-04 ENCOUNTER — RESULT REVIEW (OUTPATIENT)
Age: 35
End: 2020-01-04

## 2020-04-30 ENCOUNTER — MESSAGE (OUTPATIENT)
Age: 35
End: 2020-04-30

## 2020-07-23 ENCOUNTER — RESULT REVIEW (OUTPATIENT)
Age: 35
End: 2020-07-23

## 2021-07-18 NOTE — PATIENT PROFILE ADULT. - MEDICATIONS BROUGHT TO HOSPITAL, PROFILE
Problem: Pain  Goal: #Acceptable pain level achieved/maintained at rest using NRS/Faces  Description: This goal is used for patients who can self-report.  Acceptable means the level is at or below the identified comfort/function goal.  Outcome: Outcome Not Met, Continue to Monitor  Goal: # Acceptable pain level achieved/maintained at rest using NRS/Faces without oversedation (opioid naive or PCA/Epidural infusion)  Description: This goal is used if Opioid-naïve or on PCA/Epidural Infusion.  Outcome: Outcome Not Met, Continue to Monitor      no

## 2021-08-31 NOTE — DISCHARGE NOTE ADULT - NS AS DC STROKE DX YN
After reviewing the patient's responses to the questionnaire and additional information in their electronic health record, I have determined that their illness cannot safely and effectively be addressed through a virtual visit. It requires a face-to-face evaluation by a Physician, Nurse Practitioner, or Physician Assistant.     The patient will be advised to seek face-to-face care at a local Urgent Care or Immediate Care Center for an expedited and thorough face-to-face evaluation and consideration for diagnostic testing for this medical condition.   
no

## 2021-09-15 ENCOUNTER — TRANSCRIPTION ENCOUNTER (OUTPATIENT)
Age: 36
End: 2021-09-15

## 2022-07-29 NOTE — ED ADULT NURSE NOTE - OBJECTIVE STATEMENT
No fractures seen on x-ray; arthritic changes noted  Hardware in left knee intact  Radiology does the final read; if they see anything I did not, we will call you  The first 24-48 hours is the most painful  Rest, use ice and/or heat (ice helps inflammation; heat can relax tense muscles)  Continue using over the counter medications to treat symptoms  If pain is not improving, follow-up with ortho or your PCP  Musculoskeletal Pain   AMBULATORY CARE:   Musculoskeletal pain  can occur in muscles, bones, ligaments, tendons, or nerves  The pain can be dull, achy, or sharp  You may have pain and tenderness to the touch as well  The pain can occur anywhere in your body  Musculoskeletal pain can be from an injury, or a medical condition such as polymyositis  Seek care immediately if:   You have severe pain when you move the area  You lose feeling in the area  You have new or worse pain or swelling in the area  Your skin may feel tight  Call your doctor or pain specialist if:   You have a fever  You have pain that does not get better with treatment  You have trouble sleeping because of your pain  Your painful area becomes more tender, red, and warm to the touch  You have less movement of the painful area  You have questions or concerns about your condition or care  Treatment  may include any of the following:  NSAIDs  help decrease swelling and pain or fever  This medicine is available with or without a doctor's order  NSAIDs can cause stomach bleeding or kidney problems in certain people  If you take blood thinner medicine, always ask your healthcare provider if NSAIDs are safe for you  Always read the medicine label and follow directions  Acetaminophen  decreases pain and fever  It is available without a doctor's order  Ask how much to take and how often to take it  Follow directions   Read the labels of all other medicines you are using to see if they also contain acetaminophen, or ask your doctor or pharmacist  Acetaminophen can cause liver damage if not taken correctly  Do not use more than 4 grams (4,000 milligrams) total of acetaminophen in one day  Muscle relaxers  help relax your muscles to decrease pain and muscle spasms  Steroids  may be given to decrease redness, pain, and swelling  Self-care:   Rest as directed  Avoid activity that causes pain  You may be able to return to normal activity when you can move without pain  Follow directions for rest and activity  You are at risk for injury for 3 weeks after your symptoms go away  Ice the painful area to decrease pain and swelling  Use an ice pack, or put ice in a plastic bag and cover it with a towel  Always  put a cloth between the ice and your skin  Apply the ice as often as directed for the first 24 to 48 hours  Apply compression to the area, if directed  Your healthcare provider may want you to use a splint, brace, or elastic bandage  Compression helps decrease pain and swelling in an arm or leg  A splint, brace, or bandage will also help protect the painful area when you move around  Elevate a painful arm or leg to reduce swelling and pain  Elevate your limb while you are sitting or lying  Prop a painful leg on pillows to keep it above the level of your heart  Follow up with your doctor or pain specialist as directed: You may need more tests to help healthcare providers find the cause of your muscle pain  You may need physical therapy to learn muscle strengthening exercises  Write down your questions so you remember to ask them during your visits  © [employer] 2022 Information is for End User's use only and may not be sold, redistributed or otherwise used for commercial purposes  All illustrations and images included in CareNotes® are the copyrighted property of [employer]  or Jannet Pierre  The above information is an  only   It is not intended as medical advice for individual conditions or treatments  Talk to your doctor, nurse or pharmacist before following any medical regimen to see if it is safe and effective for you  Pt admitted to ED c/o epiigastric abd pain x 2 wks. Increased pain last night assoc with NVD, pt states she vomiied blood last night

## 2022-11-07 NOTE — ED STATDOCS - ATTENDING CONTRIBUTION TO CARE
Statement Selected
eyeglasses
I, Abril Jackson, performed the initial face to face bedside interview with this patient regarding history of present illness, review of symptoms and relevant past medical, social and family history.  I completed an independent physical examination.  I was the initial provider who evaluated this patient. I have signed out the follow up of any pending tests (i.e. labs, radiological studies) to the ACP.  I have communicated the patient’s plan of care and disposition with the ACP.  The history, relevant review of systems, past medical and surgical history, medical decision making, and physical examination was documented by the scribe in my presence and I attest to the accuracy of the documentation.

## 2023-05-09 NOTE — ED STATDOCS - DISCUSSED CLINICAL AND RADIOLOGICAL FINDINGS WITH, MDM
patient/family Olumiant Pregnancy And Lactation Text: Based on animal studies, Olumiant may cause embryo-fetal harm when administered to pregnant women.  The medication should not be used in pregnancy.  Breastfeeding is not recommended during treatment.

## 2024-06-08 NOTE — DISCHARGE NOTE ADULT - ADMISSION DATE +STARTOFVISITDATE
Statement Selected
Please follow up with your primary care provider as soon as possible. Please note that I have provided you with the results of your labs and imaging including all incidental findings. Please make sure you see a gastroenterologist as sometimes all pathologies are not seen in the CAT scan and further testing might be required. Please return to us if the pain returns or worsens, or if you have any fever or chills or if any other health concerns.    While CAT scan or CT scan imaging is an ideal imaging in the emergent setting, at times it is not the most sensitive in diagnosis of all disease process. Please note that you have been provided written copies of the imaging and that you can access real time reports using the patient portal link. You can also have your primary care provider or your specialist, pull up the real time reports or review images. All CT type images are read by our radiology specialist and ER defers to their expertise on findings. As stated there are certain pathologies that can be missed even with the best attention to detail, due to the inherent limitation of the CAT scan imaging such as ulcers, small masses or tumors or even certain cancers. Also note that most if not all CAT scan imaging can find incidental findings. Incidental findings are reported findings that are not the main goal behind doing the CAT scan but are details that the radiologist sees that he feels the patient should know about. Generally most incidental findings are benign but NOT always. So please make sure you report all incidental findings to your primary care provider and your specialist, as they might need further imaging or testing to rule out certain conditions. You can potentially need other imaging such as MRI (magnetic resonance imaging), gastric endoscopy, colonoscopy, etc. Sometimes nothing needs to be done, but this is a decision to be made by the primary or specialist so please inform them of the findings.     _________________    Abdominal Pain, Adult  A health care provider talking to a person during a medical exam.  Pain in the abdomen (abdominal pain) can be caused by many things. In most cases, it gets better with no treatment or by being treated at home. But in some cases, it can be serious.    Your health care provider will ask questions about your medical history and do a physical exam to try to figure out what is causing your pain.    Follow these instructions at home:  Medicines    Take over-the-counter and prescription medicines only as told by your provider.  Do not take medicines that help you poop (laxatives) unless told by your provider.  General instructions    Watch your condition for any changes.  Drink enough fluid to keep your pee (urine) pale yellow.  Contact a health care provider if:  Your pain changes, gets worse, or lasts longer than expected.  You have severe cramping or bloating in your abdomen, or you vomit.  Your pain gets worse with meals, after eating, or with certain foods.  You are constipated or have diarrhea for more than 2–3 days.  You are not hungry, or you lose weight without trying.  You have signs of dehydration. These may include:  Dark pee, very little pee, or no pee.  Cracked lips or dry mouth.  Sleepiness or weakness.  You have pain when you pee (urinate) or poop.  Your abdominal pain wakes you up at night.  You have blood in your pee.  You have a fever.  Get help right away if:  You cannot stop vomiting.  Your pain is only in one part of the abdomen. Pain on the right side could be caused by appendicitis.  You have bloody or black poop (stool), or poop that looks like tar.  You have trouble breathing.  You have chest pain.  These symptoms may be an emergency. Get help right away. Call 911.  Do not wait to see if the symptoms will go away.  Do not drive yourself to the hospital.  This information is not intended to replace advice given to you by your health care provider. Make sure you discuss any questions you have with your health care provider.

## 2024-06-10 ENCOUNTER — NON-APPOINTMENT (OUTPATIENT)
Age: 39
End: 2024-06-10

## 2025-07-02 ENCOUNTER — NON-APPOINTMENT (OUTPATIENT)
Age: 40
End: 2025-07-02